# Patient Record
Sex: FEMALE | Race: BLACK OR AFRICAN AMERICAN | NOT HISPANIC OR LATINO | URBAN - METROPOLITAN AREA
[De-identification: names, ages, dates, MRNs, and addresses within clinical notes are randomized per-mention and may not be internally consistent; named-entity substitution may affect disease eponyms.]

---

## 2017-05-21 ENCOUNTER — EMERGENCY (EMERGENCY)
Facility: HOSPITAL | Age: 52
LOS: 1 days | Discharge: HOME | End: 2017-05-21
Attending: EMERGENCY MEDICINE | Admitting: EMERGENCY MEDICINE
Payer: COMMERCIAL

## 2017-05-21 VITALS
DIASTOLIC BLOOD PRESSURE: 100 MMHG | SYSTOLIC BLOOD PRESSURE: 150 MMHG | OXYGEN SATURATION: 100 % | RESPIRATION RATE: 16 BRPM | TEMPERATURE: 98 F | HEART RATE: 74 BPM

## 2017-05-21 LAB
ALBUMIN SERPL ELPH-MCNC: 3.5 G/DL — SIGNIFICANT CHANGE UP (ref 3.3–5)
ALP SERPL-CCNC: 60 U/L — SIGNIFICANT CHANGE UP (ref 40–120)
ALT FLD-CCNC: 10 U/L — SIGNIFICANT CHANGE UP (ref 4–33)
AST SERPL-CCNC: 15 U/L — SIGNIFICANT CHANGE UP (ref 4–32)
BASOPHILS # BLD AUTO: 0.01 K/UL — SIGNIFICANT CHANGE UP (ref 0–0.2)
BASOPHILS NFR BLD AUTO: 0.1 % — SIGNIFICANT CHANGE UP (ref 0–2)
BILIRUB SERPL-MCNC: 0.5 MG/DL — SIGNIFICANT CHANGE UP (ref 0.2–1.2)
BUN SERPL-MCNC: 14 MG/DL — SIGNIFICANT CHANGE UP (ref 7–23)
CALCIUM SERPL-MCNC: 8.8 MG/DL — SIGNIFICANT CHANGE UP (ref 8.4–10.5)
CHLORIDE SERPL-SCNC: 107 MMOL/L — SIGNIFICANT CHANGE UP (ref 98–107)
CK MB BLD-MCNC: 1 NG/ML — SIGNIFICANT CHANGE UP (ref 1–4.7)
CK MB BLD-MCNC: SIGNIFICANT CHANGE UP (ref 0–2.5)
CK SERPL-CCNC: 31 U/L — SIGNIFICANT CHANGE UP (ref 25–170)
CO2 SERPL-SCNC: 24 MMOL/L — SIGNIFICANT CHANGE UP (ref 22–31)
CREAT SERPL-MCNC: 0.62 MG/DL — SIGNIFICANT CHANGE UP (ref 0.5–1.3)
EOSINOPHIL # BLD AUTO: 0.13 K/UL — SIGNIFICANT CHANGE UP (ref 0–0.5)
EOSINOPHIL NFR BLD AUTO: 1.6 % — SIGNIFICANT CHANGE UP (ref 0–6)
GLUCOSE SERPL-MCNC: 94 MG/DL — SIGNIFICANT CHANGE UP (ref 70–99)
HCT VFR BLD CALC: 37.3 % — SIGNIFICANT CHANGE UP (ref 34.5–45)
HGB BLD-MCNC: 12 G/DL — SIGNIFICANT CHANGE UP (ref 11.5–15.5)
IMM GRANULOCYTES NFR BLD AUTO: 0.1 % — SIGNIFICANT CHANGE UP (ref 0–1.5)
LYMPHOCYTES # BLD AUTO: 2.5 K/UL — SIGNIFICANT CHANGE UP (ref 1–3.3)
LYMPHOCYTES # BLD AUTO: 29.9 % — SIGNIFICANT CHANGE UP (ref 13–44)
MCHC RBC-ENTMCNC: 28.8 PG — SIGNIFICANT CHANGE UP (ref 27–34)
MCHC RBC-ENTMCNC: 32.2 % — SIGNIFICANT CHANGE UP (ref 32–36)
MCV RBC AUTO: 89.4 FL — SIGNIFICANT CHANGE UP (ref 80–100)
MONOCYTES # BLD AUTO: 0.73 K/UL — SIGNIFICANT CHANGE UP (ref 0–0.9)
MONOCYTES NFR BLD AUTO: 8.7 % — SIGNIFICANT CHANGE UP (ref 2–14)
NEUTROPHILS # BLD AUTO: 4.98 K/UL — SIGNIFICANT CHANGE UP (ref 1.8–7.4)
NEUTROPHILS NFR BLD AUTO: 59.6 % — SIGNIFICANT CHANGE UP (ref 43–77)
PLATELET # BLD AUTO: 257 K/UL — SIGNIFICANT CHANGE UP (ref 150–400)
PMV BLD: 10.2 FL — SIGNIFICANT CHANGE UP (ref 7–13)
POTASSIUM SERPL-MCNC: 4.8 MMOL/L — SIGNIFICANT CHANGE UP (ref 3.5–5.3)
POTASSIUM SERPL-SCNC: 4.8 MMOL/L — SIGNIFICANT CHANGE UP (ref 3.5–5.3)
PROT SERPL-MCNC: 7 G/DL — SIGNIFICANT CHANGE UP (ref 6–8.3)
RBC # BLD: 4.17 M/UL — SIGNIFICANT CHANGE UP (ref 3.8–5.2)
RBC # FLD: 15.5 % — HIGH (ref 10.3–14.5)
SODIUM SERPL-SCNC: 144 MMOL/L — SIGNIFICANT CHANGE UP (ref 135–145)
WBC # BLD: 8.36 K/UL — SIGNIFICANT CHANGE UP (ref 3.8–10.5)
WBC # FLD AUTO: 8.36 K/UL — SIGNIFICANT CHANGE UP (ref 3.8–10.5)

## 2017-05-21 PROCEDURE — 99285 EMERGENCY DEPT VISIT HI MDM: CPT

## 2017-05-21 RX ORDER — KETOROLAC TROMETHAMINE 30 MG/ML
15 SYRINGE (ML) INJECTION ONCE
Qty: 0 | Refills: 0 | Status: DISCONTINUED | OUTPATIENT
Start: 2017-05-21 | End: 2017-05-21

## 2017-05-21 RX ORDER — ACETAMINOPHEN 500 MG
975 TABLET ORAL ONCE
Qty: 0 | Refills: 0 | Status: COMPLETED | OUTPATIENT
Start: 2017-05-21 | End: 2017-05-21

## 2017-05-21 RX ORDER — SODIUM CHLORIDE 9 MG/ML
1000 INJECTION INTRAMUSCULAR; INTRAVENOUS; SUBCUTANEOUS ONCE
Qty: 0 | Refills: 0 | Status: COMPLETED | OUTPATIENT
Start: 2017-05-21 | End: 2017-05-21

## 2017-05-21 RX ADMIN — Medication 15 MILLIGRAM(S): at 11:56

## 2017-05-21 RX ADMIN — Medication 975 MILLIGRAM(S): at 11:56

## 2017-05-21 RX ADMIN — SODIUM CHLORIDE 2000 MILLILITER(S): 9 INJECTION INTRAMUSCULAR; INTRAVENOUS; SUBCUTANEOUS at 11:57

## 2017-05-21 RX ADMIN — Medication 0.5 MILLIGRAM(S): at 11:57

## 2017-05-21 NOTE — ED PROVIDER NOTE - OBJECTIVE STATEMENT
52 yo F presenting with bilateral thigh pain for 3-4 days.  Progressive, waxing and waning, worse with movement, not better with anything, moderate in severity.  Reports cramping feelinng, sometimes involving her hands.  No trauma.  Reports chronic back pain, states this is unchanged and has been present for a year or more.  No urine retention, no urine or fecal incont, no numbness, no groin anesthesia, no ataxia or difficulty ambulating.

## 2017-05-21 NOTE — ED PROVIDER NOTE - PHYSICAL EXAMINATION
Chandrika att: General: Well appearing, nontoxic, no acute distress; Head: Normocephalic Atraumatic; Eyes: PERRL, EOMI; ENT: Airway patent; Neck: supple; Chest: Lungs clear to auscultation bilateral; Cardiac: Regular rate and rhythm, no murmurs, rubs or gallops; Abdomen: soft, nontender, nondistended; no guarding or rebound; Musculoskeletal: Bilateral thigh tenderness, no pain with axial loading, left knee mild tenderness, no effusion or warmth, calves symmetric, nontender, no palpable cord; Skin: No rash, normal skin tone; Neuro: Alert and Oriented to person, place, and time; No focal deficit, CN 2-12 symmetric and intact

## 2018-06-24 NOTE — ED PROVIDER NOTE - NS ED NOTE AC HIGH RISK COUNTRIES
No · Likely more of toxic metabolic encephalopathy secondary to patient's infection/pneumonia  · Has history of multiple strokes in the past  · CT scan of the head done today did not reveal any acute findings  · Neuro checks  · Consider Neurology consultation, if no improvement  · Manage patient's infection/pneumonia

## 2020-06-25 ENCOUNTER — TRANSCRIPTION ENCOUNTER (OUTPATIENT)
Age: 55
End: 2020-06-25

## 2020-06-26 ENCOUNTER — INPATIENT (INPATIENT)
Facility: HOSPITAL | Age: 55
LOS: 3 days | Discharge: HOME HEALTH | End: 2020-06-30
Attending: HOSPITALIST | Admitting: HOSPITALIST
Payer: COMMERCIAL

## 2020-06-26 VITALS
SYSTOLIC BLOOD PRESSURE: 113 MMHG | OXYGEN SATURATION: 100 % | HEART RATE: 91 BPM | RESPIRATION RATE: 16 BRPM | TEMPERATURE: 100 F | DIASTOLIC BLOOD PRESSURE: 73 MMHG

## 2020-06-26 DIAGNOSIS — Z02.9 ENCOUNTER FOR ADMINISTRATIVE EXAMINATIONS, UNSPECIFIED: ICD-10-CM

## 2020-06-26 DIAGNOSIS — M19.90 UNSPECIFIED OSTEOARTHRITIS, UNSPECIFIED SITE: ICD-10-CM

## 2020-06-26 DIAGNOSIS — I10 ESSENTIAL (PRIMARY) HYPERTENSION: ICD-10-CM

## 2020-06-26 DIAGNOSIS — L02.511 CUTANEOUS ABSCESS OF RIGHT HAND: ICD-10-CM

## 2020-06-26 DIAGNOSIS — L03.011 CELLULITIS OF RIGHT FINGER: ICD-10-CM

## 2020-06-26 DIAGNOSIS — Z00.00 ENCOUNTER FOR GENERAL ADULT MEDICAL EXAMINATION WITHOUT ABNORMAL FINDINGS: ICD-10-CM

## 2020-06-26 DIAGNOSIS — A41.9 SEPSIS, UNSPECIFIED ORGANISM: ICD-10-CM

## 2020-06-26 LAB
ALBUMIN SERPL ELPH-MCNC: 3.3 G/DL — SIGNIFICANT CHANGE UP (ref 3.3–5)
ALP SERPL-CCNC: 70 U/L — SIGNIFICANT CHANGE UP (ref 40–120)
ALT FLD-CCNC: 13 U/L — SIGNIFICANT CHANGE UP (ref 4–33)
ANION GAP SERPL CALC-SCNC: 13 MMO/L — SIGNIFICANT CHANGE UP (ref 7–14)
AST SERPL-CCNC: 11 U/L — SIGNIFICANT CHANGE UP (ref 4–32)
BASOPHILS # BLD AUTO: 0.03 K/UL — SIGNIFICANT CHANGE UP (ref 0–0.2)
BASOPHILS NFR BLD AUTO: 0.2 % — SIGNIFICANT CHANGE UP (ref 0–2)
BILIRUB SERPL-MCNC: 0.2 MG/DL — SIGNIFICANT CHANGE UP (ref 0.2–1.2)
BUN SERPL-MCNC: 3 MG/DL — LOW (ref 7–23)
CALCIUM SERPL-MCNC: 9.3 MG/DL — SIGNIFICANT CHANGE UP (ref 8.4–10.5)
CHLORIDE SERPL-SCNC: 101 MMOL/L — SIGNIFICANT CHANGE UP (ref 98–107)
CO2 SERPL-SCNC: 22 MMOL/L — SIGNIFICANT CHANGE UP (ref 22–31)
CREAT SERPL-MCNC: 0.67 MG/DL — SIGNIFICANT CHANGE UP (ref 0.5–1.3)
CRP SERPL-MCNC: 258.8 MG/L — HIGH
EOSINOPHIL # BLD AUTO: 0.04 K/UL — SIGNIFICANT CHANGE UP (ref 0–0.5)
EOSINOPHIL NFR BLD AUTO: 0.2 % — SIGNIFICANT CHANGE UP (ref 0–6)
ERYTHROCYTE [SEDIMENTATION RATE] IN BLOOD: 69 MM/HR — HIGH (ref 4–25)
GLUCOSE SERPL-MCNC: 133 MG/DL — HIGH (ref 70–99)
HCT VFR BLD CALC: 34.4 % — LOW (ref 34.5–45)
HGB BLD-MCNC: 11.3 G/DL — LOW (ref 11.5–15.5)
IMM GRANULOCYTES NFR BLD AUTO: 0.7 % — SIGNIFICANT CHANGE UP (ref 0–1.5)
LYMPHOCYTES # BLD AUTO: 1.74 K/UL — SIGNIFICANT CHANGE UP (ref 1–3.3)
LYMPHOCYTES # BLD AUTO: 10.6 % — LOW (ref 13–44)
MCHC RBC-ENTMCNC: 30.7 PG — SIGNIFICANT CHANGE UP (ref 27–34)
MCHC RBC-ENTMCNC: 32.8 % — SIGNIFICANT CHANGE UP (ref 32–36)
MCV RBC AUTO: 93.5 FL — SIGNIFICANT CHANGE UP (ref 80–100)
MONOCYTES # BLD AUTO: 1 K/UL — HIGH (ref 0–0.9)
MONOCYTES NFR BLD AUTO: 6.1 % — SIGNIFICANT CHANGE UP (ref 2–14)
NEUTROPHILS # BLD AUTO: 13.47 K/UL — HIGH (ref 1.8–7.4)
NEUTROPHILS NFR BLD AUTO: 82.2 % — HIGH (ref 43–77)
NRBC # FLD: 0 K/UL — SIGNIFICANT CHANGE UP (ref 0–0)
PLATELET # BLD AUTO: 262 K/UL — SIGNIFICANT CHANGE UP (ref 150–400)
PMV BLD: 10 FL — SIGNIFICANT CHANGE UP (ref 7–13)
POTASSIUM SERPL-MCNC: 4 MMOL/L — SIGNIFICANT CHANGE UP (ref 3.5–5.3)
POTASSIUM SERPL-SCNC: 4 MMOL/L — SIGNIFICANT CHANGE UP (ref 3.5–5.3)
PROT SERPL-MCNC: 6.9 G/DL — SIGNIFICANT CHANGE UP (ref 6–8.3)
RBC # BLD: 3.68 M/UL — LOW (ref 3.8–5.2)
RBC # FLD: 15.5 % — HIGH (ref 10.3–14.5)
SARS-COV-2 RNA SPEC QL NAA+PROBE: SIGNIFICANT CHANGE UP
SODIUM SERPL-SCNC: 136 MMOL/L — SIGNIFICANT CHANGE UP (ref 135–145)
WBC # BLD: 16.4 K/UL — HIGH (ref 3.8–10.5)
WBC # FLD AUTO: 16.4 K/UL — HIGH (ref 3.8–10.5)

## 2020-06-26 PROCEDURE — 73130 X-RAY EXAM OF HAND: CPT | Mod: 26,RT

## 2020-06-26 PROCEDURE — 99223 1ST HOSP IP/OBS HIGH 75: CPT | Mod: GC

## 2020-06-26 RX ORDER — IBUPROFEN 200 MG
600 TABLET ORAL EVERY 6 HOURS
Refills: 0 | Status: DISCONTINUED | OUTPATIENT
Start: 2020-06-26 | End: 2020-06-30

## 2020-06-26 RX ORDER — PIPERACILLIN AND TAZOBACTAM 4; .5 G/20ML; G/20ML
3.38 INJECTION, POWDER, LYOPHILIZED, FOR SOLUTION INTRAVENOUS ONCE
Refills: 0 | Status: COMPLETED | OUTPATIENT
Start: 2020-06-26 | End: 2020-06-26

## 2020-06-26 RX ORDER — PIPERACILLIN AND TAZOBACTAM 4; .5 G/20ML; G/20ML
3.38 INJECTION, POWDER, LYOPHILIZED, FOR SOLUTION INTRAVENOUS EVERY 8 HOURS
Refills: 0 | Status: DISCONTINUED | OUTPATIENT
Start: 2020-06-27 | End: 2020-06-28

## 2020-06-26 RX ORDER — SODIUM CHLORIDE 9 MG/ML
1000 INJECTION INTRAMUSCULAR; INTRAVENOUS; SUBCUTANEOUS ONCE
Refills: 0 | Status: COMPLETED | OUTPATIENT
Start: 2020-06-26 | End: 2020-06-26

## 2020-06-26 RX ORDER — VANCOMYCIN HCL 1 G
1000 VIAL (EA) INTRAVENOUS ONCE
Refills: 0 | Status: COMPLETED | OUTPATIENT
Start: 2020-06-26 | End: 2020-06-26

## 2020-06-26 RX ORDER — ACETAMINOPHEN 500 MG
1000 TABLET ORAL EVERY 6 HOURS
Refills: 0 | Status: DISCONTINUED | OUTPATIENT
Start: 2020-06-26 | End: 2020-06-30

## 2020-06-26 RX ORDER — GABAPENTIN 400 MG/1
300 CAPSULE ORAL THREE TIMES A DAY
Refills: 0 | Status: DISCONTINUED | OUTPATIENT
Start: 2020-06-26 | End: 2020-06-30

## 2020-06-26 RX ORDER — LISINOPRIL 2.5 MG/1
5 TABLET ORAL DAILY
Refills: 0 | Status: DISCONTINUED | OUTPATIENT
Start: 2020-06-26 | End: 2020-06-30

## 2020-06-26 RX ORDER — VANCOMYCIN HCL 1 G
1250 VIAL (EA) INTRAVENOUS EVERY 12 HOURS
Refills: 0 | Status: DISCONTINUED | OUTPATIENT
Start: 2020-06-27 | End: 2020-06-30

## 2020-06-26 RX ORDER — KETOROLAC TROMETHAMINE 30 MG/ML
15 SYRINGE (ML) INJECTION ONCE
Refills: 0 | Status: DISCONTINUED | OUTPATIENT
Start: 2020-06-26 | End: 2020-06-26

## 2020-06-26 RX ADMIN — SODIUM CHLORIDE 1000 MILLILITER(S): 9 INJECTION INTRAMUSCULAR; INTRAVENOUS; SUBCUTANEOUS at 17:00

## 2020-06-26 RX ADMIN — PIPERACILLIN AND TAZOBACTAM 200 GRAM(S): 4; .5 INJECTION, POWDER, LYOPHILIZED, FOR SOLUTION INTRAVENOUS at 16:29

## 2020-06-26 RX ADMIN — Medication 15 MILLIGRAM(S): at 20:00

## 2020-06-26 RX ADMIN — SODIUM CHLORIDE 1000 MILLILITER(S): 9 INJECTION INTRAMUSCULAR; INTRAVENOUS; SUBCUTANEOUS at 16:29

## 2020-06-26 RX ADMIN — Medication 250 MILLIGRAM(S): at 17:39

## 2020-06-26 RX ADMIN — Medication 1000 MILLIGRAM(S): at 18:30

## 2020-06-26 RX ADMIN — GABAPENTIN 300 MILLIGRAM(S): 400 CAPSULE ORAL at 21:32

## 2020-06-26 RX ADMIN — PIPERACILLIN AND TAZOBACTAM 3.38 GRAM(S): 4; .5 INJECTION, POWDER, LYOPHILIZED, FOR SOLUTION INTRAVENOUS at 17:00

## 2020-06-26 NOTE — ED PROVIDER NOTE - CLINICAL SUMMARY MEDICAL DECISION MAKING FREE TEXT BOX
Tish: R thumb palmar-aspect red/swelling/pain c/w infection x 5 days. No trauma. Will give IV Abx. Call Hand Surg. Failing Keflex. Admission for abscess/cellulitis, possibly flexor tenosynovitis.

## 2020-06-26 NOTE — ED PROVIDER NOTE - OBJECTIVE STATEMENT
54y f no sig PMhx p/w R thumb swelling, pain, and drainage. 5 days ago the pt developed slight pain and swelling along the plantar aspect of her thumb, which has since progressed to include the entire length of the thumb, and has become so swollen and painful she is unable to range the digit at all. She also noted pustular drainage from the base of the thumb where her PMD intended to cipriano the area of swelling 3 days ago and again today. For 3 days she has been taking Keflex without improvement of her symptoms; she also noted subjective fevers/chills at home during this time. Pt denies any known trauma to the thumb, denies cuts/bites/scrapes.

## 2020-06-26 NOTE — H&P ADULT - NSHPREVIEWOFSYSTEMS_GEN_ALL_CORE
REVIEW OF SYSTEMS:    CONSTITUTIONAL: Appears in mild pain   EYES/ENT: No visual changes;  No vertigo or throat pain   NECK: No pain or stiffness  RESPIRATORY: No cough, wheezing, hemoptysis; No shortness of breath  CARDIOVASCULAR: No chest pain or palpitations  GASTROINTESTINAL: No abdominal or epigastric pain. No nausea, vomiting, or hematemesis; No diarrhea or constipation. No melena or hematochezia.  GENITOURINARY: No dysuria, frequency or hematuria  NEUROLOGICAL: No numbness or weakness  SKIN: swollen and inflamed right thumb and index finger

## 2020-06-26 NOTE — H&P ADULT - PROBLEM SELECTOR PLAN 1
sepsis 2/2 hand cellulitis, elevated WBC of 16.4, ESR and CRP, febrile at home, tachycardia   - vanc/zosyn   - MRI hand r/o osteomyelitis sepsis 2/2 hand cellulitis, elevated WBC of 16.4, ESR and CRP, febrile at home, tachycardia   - vanc/zosyn   - MRI hand r/o osteomyelitis  - f/u wound cultures

## 2020-06-26 NOTE — H&P ADULT - NSHPLABSRESULTS_GEN_ALL_CORE
11.3   16.40 )-----------( 262      ( 26 Jun 2020 16:06 )             34.4     06-26    136  |  101  |  3<L>  ----------------------------<  133<H>  4.0   |  22  |  0.67    Ca    9.3      26 Jun 2020 16:06    TPro  6.9  /  Alb  3.3  /  TBili  0.2  /  DBili  x   /  AST  11  /  ALT  13  /  AlkPhos  70  06-26            Lactate Trend      CAPILLARY BLOOD GLUCOSE

## 2020-06-26 NOTE — ED PROVIDER NOTE - PROGRESS NOTE DETAILS
Zoraida, pgy3: hand surgery to drain abscess at bedside, do not suspect flexor tenosynovitis, will likely placed small drain, and recommending admission for IV antibx

## 2020-06-26 NOTE — ED ADULT TRIAGE NOTE - CHIEF COMPLAINT QUOTE
c/o right hand swelling/ pain x 1 week, fever/ chills x 3 days. pt dx by pcp with right hand  infection on Wednesday sent to ed for eval by hand specialist. PMH: HTN, arthritis

## 2020-06-26 NOTE — PROGRESS NOTE ADULT - SUBJECTIVE AND OBJECTIVE BOX
Soft tissue infection right thumb  Drained and drains placed  Culture collected      Admit for IV Abx  Elevation and soaks TID  Will follow

## 2020-06-26 NOTE — H&P ADULT - ASSESSMENT
55 y/o F w/ PMH HTN and arthritis p/w right thumb swelling in the setting of tachycardia, elevated WBC and fever at home most likely sepsis 2/2 cellulitis. Pt currently afebrile with stable vitals wnl. Labs show leukocytosis treated with vanc/zocyn, first dose given in ED. Pain control with Tylenol and Toradol. Pt currently stable. Consult with plastics, will follow.

## 2020-06-26 NOTE — ED PROVIDER NOTE - ATTENDING CONTRIBUTION TO CARE
I performed a face-to-face evaluation of the patient and performed a history and physical examination. I agree with the history and physical examination.    R thumb palmar-aspect red/swelling/pain c/w infection x 5 days. No trauma. Will give IV Abx. Call Hand Surg. Failing Keflex. Admission for abscess/cellulitis, possibly flexor tenosynovitis.

## 2020-06-26 NOTE — H&P ADULT - HISTORY OF PRESENT ILLNESS
53 y/o F w/ PMH HTN and arthritis p/w right thumb swelling. Pt states that Sunday 53 y/o F w/ PMH HTN and arthritis p/w right thumb swelling. Pt states that she woke up on Monday morning with swollen painful thumb. She reports stinging and swelling that spread to her index finger. Swelling and pain progressed over the next two days and pt went to PCP for drainage and start of cephalexin 500mg tid. She was given tylenol and codeine for the pain. Pt does not report any trauma to the hand but states that she was planning a graduation party for Sunday night and consumed about 5-6 drinks. She also gardened last week but wore gloves and does not report any insect bite, bee sting or puncture. She reports a fever last night but denies chills, cough, SOB, chest pain, n/v, constipation and diarrhea.

## 2020-06-26 NOTE — ED ADULT NURSE NOTE - OBJECTIVE STATEMENT
Received pt in intake 10B, ambulatory, pt A&Ox4, respirations even and unlabored b/l. Pt c/o R. hand thumb redness, swelling, pain started 5 days ago after possibly from gardening. Also endorsing fever, chills. Went to PMD and had thumb I&D, dx with infection and sent to ED to c/s hand. Swelling noted on R. thumb radiating to hand. MD Aceves at bedside for eval. IVL 20g Angiocath placed on left hand. Labs sent. PMHx htn, arthritis. Will continue to monitor.

## 2020-06-26 NOTE — H&P ADULT - NSHPPHYSICALEXAM_GEN_ALL_CORE
PHYSICAL EXAM:  T(C): 37.1 (06-26-20 @ 18:14), Max: 37.5 (06-26-20 @ 14:49)  HR: 70 (06-26-20 @ 18:14) (70 - 91)  BP: 125/80 (06-26-20 @ 18:14) (113/73 - 125/80)  RR: 16 (06-26-20 @ 18:14) (16 - 16)  SpO2: 100% (06-26-20 @ 18:14) (100% - 100%)  GENERAL: NAD, well-developed  HEAD:  Atraumatic, Normocephalic  EYES: EOMI, PERRLA, conjunctiva and sclera clear  NECK: Supple, No JVD  CHEST/LUNG: Clear to auscultation bilaterally; No wheeze  HEART: Regular rate and rhythm; No murmurs, rubs, or gallops  ABDOMEN: Soft, Nontender, Nondistended; Bowel sounds present  EXTREMITIES:  2+ Peripheral Pulses, No clubbing, cyanosis, or edema  PSYCH: AAOx3  NEUROLOGY: non-focal  SKIN: No rashes or lesions PHYSICAL EXAM:  T(C): 37.1 (06-26-20 @ 18:14), Max: 37.5 (06-26-20 @ 14:49)  HR: 70 (06-26-20 @ 18:14) (70 - 91)  BP: 125/80 (06-26-20 @ 18:14) (113/73 - 125/80)  RR: 16 (06-26-20 @ 18:14) (16 - 16)  SpO2: 100% (06-26-20 @ 18:14) (100% - 100%)  GENERAL: NAD, well-developed  HEAD:  Atraumatic, Normocephalic  EYES: EOMI, PERRLA, conjunctiva and sclera clear  NECK: Supple, No JVD  CHEST/LUNG: Clear to auscultation bilaterally; No wheeze  HEART: Regular rate and rhythm; No murmurs, rubs, or gallops  ABDOMEN: Soft, Nontender, Nondistended; Bowel sounds present  EXTREMITIES:  2+ Peripheral Pulses, No clubbing, cyanosis  PSYCH: AAOx3  NEUROLOGY: non-focal  SKIN: right hand dressing in place with localized erythema and edema involving the right thumb and hand

## 2020-06-26 NOTE — H&P ADULT - ATTENDING COMMENTS
Patient seen and examined on 6/26/20.    54F with PMH HTN presented with progressive right hand/thumb pain and swelling that started 5 days PTA. Developed right thumb abscess, darined at PCP office and was given PO abx. Patient presented to ED with progressive symptoms. Sepsis due to right thumb abscess and cellulitis.  agree with abx regimen. MR to rule-out OM given close proximity to bone.  f/u hand surgery.

## 2020-06-26 NOTE — ED PROVIDER NOTE - PHYSICAL EXAMINATION
MSK: R thumb diffusely swollen, with worsening swelling at proximal end/MTP joint, significantly decreased ROM, with severe pain to palpation and w/ ROM   SKIN: R thumb area of swelling appears pale/yellow w/ active drainage from two apparent needle pricks; also obvious abrasion/skin tear near base of thumb w/out overlying skin flap, area has pus overlying and appears non-healing

## 2020-06-27 LAB
BASOPHILS # BLD AUTO: 0.02 K/UL — SIGNIFICANT CHANGE UP (ref 0–0.2)
BASOPHILS NFR BLD AUTO: 0.2 % — SIGNIFICANT CHANGE UP (ref 0–2)
EOSINOPHIL # BLD AUTO: 0.08 K/UL — SIGNIFICANT CHANGE UP (ref 0–0.5)
EOSINOPHIL NFR BLD AUTO: 0.8 % — SIGNIFICANT CHANGE UP (ref 0–6)
HCT VFR BLD CALC: 33.7 % — LOW (ref 34.5–45)
HCV AB S/CO SERPL IA: 0.29 S/CO — SIGNIFICANT CHANGE UP (ref 0–0.99)
HCV AB SERPL-IMP: SIGNIFICANT CHANGE UP
HGB BLD-MCNC: 10.6 G/DL — LOW (ref 11.5–15.5)
IMM GRANULOCYTES NFR BLD AUTO: 0.4 % — SIGNIFICANT CHANGE UP (ref 0–1.5)
LYMPHOCYTES # BLD AUTO: 1.81 K/UL — SIGNIFICANT CHANGE UP (ref 1–3.3)
LYMPHOCYTES # BLD AUTO: 18.4 % — SIGNIFICANT CHANGE UP (ref 13–44)
MCHC RBC-ENTMCNC: 29.2 PG — SIGNIFICANT CHANGE UP (ref 27–34)
MCHC RBC-ENTMCNC: 31.5 % — LOW (ref 32–36)
MCV RBC AUTO: 92.8 FL — SIGNIFICANT CHANGE UP (ref 80–100)
MONOCYTES # BLD AUTO: 0.65 K/UL — SIGNIFICANT CHANGE UP (ref 0–0.9)
MONOCYTES NFR BLD AUTO: 6.6 % — SIGNIFICANT CHANGE UP (ref 2–14)
NEUTROPHILS # BLD AUTO: 7.23 K/UL — SIGNIFICANT CHANGE UP (ref 1.8–7.4)
NEUTROPHILS NFR BLD AUTO: 73.6 % — SIGNIFICANT CHANGE UP (ref 43–77)
NRBC # FLD: 0 K/UL — SIGNIFICANT CHANGE UP (ref 0–0)
PLATELET # BLD AUTO: 248 K/UL — SIGNIFICANT CHANGE UP (ref 150–400)
PMV BLD: 9.9 FL — SIGNIFICANT CHANGE UP (ref 7–13)
RBC # BLD: 3.63 M/UL — LOW (ref 3.8–5.2)
RBC # FLD: 15.4 % — HIGH (ref 10.3–14.5)
WBC # BLD: 9.83 K/UL — SIGNIFICANT CHANGE UP (ref 3.8–10.5)
WBC # FLD AUTO: 9.83 K/UL — SIGNIFICANT CHANGE UP (ref 3.8–10.5)

## 2020-06-27 PROCEDURE — 99233 SBSQ HOSP IP/OBS HIGH 50: CPT | Mod: GC

## 2020-06-27 RX ORDER — TRAMADOL HYDROCHLORIDE 50 MG/1
25 TABLET ORAL ONCE
Refills: 0 | Status: DISCONTINUED | OUTPATIENT
Start: 2020-06-27 | End: 2020-06-27

## 2020-06-27 RX ORDER — TRAMADOL HYDROCHLORIDE 50 MG/1
50 TABLET ORAL ONCE
Refills: 0 | Status: DISCONTINUED | OUTPATIENT
Start: 2020-06-27 | End: 2020-06-27

## 2020-06-27 RX ADMIN — GABAPENTIN 300 MILLIGRAM(S): 400 CAPSULE ORAL at 05:20

## 2020-06-27 RX ADMIN — GABAPENTIN 300 MILLIGRAM(S): 400 CAPSULE ORAL at 22:30

## 2020-06-27 RX ADMIN — LISINOPRIL 5 MILLIGRAM(S): 2.5 TABLET ORAL at 05:20

## 2020-06-27 RX ADMIN — Medication 600 MILLIGRAM(S): at 11:19

## 2020-06-27 RX ADMIN — PIPERACILLIN AND TAZOBACTAM 25 GRAM(S): 4; .5 INJECTION, POWDER, LYOPHILIZED, FOR SOLUTION INTRAVENOUS at 09:00

## 2020-06-27 RX ADMIN — TRAMADOL HYDROCHLORIDE 25 MILLIGRAM(S): 50 TABLET ORAL at 14:35

## 2020-06-27 RX ADMIN — PIPERACILLIN AND TAZOBACTAM 25 GRAM(S): 4; .5 INJECTION, POWDER, LYOPHILIZED, FOR SOLUTION INTRAVENOUS at 18:28

## 2020-06-27 RX ADMIN — GABAPENTIN 300 MILLIGRAM(S): 400 CAPSULE ORAL at 12:07

## 2020-06-27 RX ADMIN — Medication 166.67 MILLIGRAM(S): at 16:06

## 2020-06-27 RX ADMIN — Medication 166.67 MILLIGRAM(S): at 05:20

## 2020-06-27 RX ADMIN — PIPERACILLIN AND TAZOBACTAM 25 GRAM(S): 4; .5 INJECTION, POWDER, LYOPHILIZED, FOR SOLUTION INTRAVENOUS at 00:00

## 2020-06-27 RX ADMIN — TRAMADOL HYDROCHLORIDE 50 MILLIGRAM(S): 50 TABLET ORAL at 22:40

## 2020-06-27 NOTE — PROGRESS NOTE ADULT - ASSESSMENT
Assessment/Plan:  Patient is a 54y old  Female who presents with a chief complaint of right swollen hand (27 Jun 2020 09:13). WBC coming down and exam improving.   - Continue soaks to hand TID  - Continue abx  - follow up cultures

## 2020-06-27 NOTE — PROGRESS NOTE ADULT - SUBJECTIVE AND OBJECTIVE BOX
PROGRESS NOTE:   Authored by Dr. Yamel Childress MD  Pager [phone] [facility], [id number] [facility]     Patient is a 54y old  Female who presents with a chief complaint of right swollen hand (26 Jun 2020 19:10)      SUBJECTIVE / OVERNIGHT EVENTS:  Pt doing well. Plastics performed incision and drainage last night and changed dressing. Hand is less swollen today and she is feeling less pain. Pt denies fever, chills, SOB, n/v/d/c     REVIEW OF SYSTEMS:    CONSTITUTIONAL: No weakness, fevers or chills  EYES/ENT: No visual changes;  No vertigo or throat pain   NECK: No pain or stiffness  RESPIRATORY: No cough, wheezing, hemoptysis; No shortness of breath  CARDIOVASCULAR: No chest pain or palpitations  GASTROINTESTINAL: No abdominal or epigastric pain. No nausea, vomiting, or hematemesis; No diarrhea or constipation. No melena or hematochezia.  GENITOURINARY: No dysuria, frequency or hematuria  NEUROLOGICAL: No numbness or weakness  SKIN: right hand swelling and pain      MEDICATIONS  (STANDING):  gabapentin 300 milliGRAM(s) Oral three times a day  lisinopril 5 milliGRAM(s) Oral daily  piperacillin/tazobactam IVPB.. 3.375 Gram(s) IV Intermittent every 8 hours  vancomycin  IVPB 1250 milliGRAM(s) IV Intermittent every 12 hours    MEDICATIONS  (PRN):  acetaminophen   Tablet .. 1000 milliGRAM(s) Oral every 6 hours PRN Moderate Pain (4 - 6)  ibuprofen  Tablet. 600 milliGRAM(s) Oral every 6 hours PRN Severe Pain (7 - 10)      CAPILLARY BLOOD GLUCOSE        I&O's Summary      PHYSICAL EXAM:  Vital Signs Last 24 Hrs  T(C): 36.7 (27 Jun 2020 05:19), Max: 37.5 (26 Jun 2020 14:49)  T(F): 98 (27 Jun 2020 05:19), Max: 99.5 (26 Jun 2020 14:49)  HR: 64 (27 Jun 2020 05:19) (58 - 91)  BP: 113/75 (27 Jun 2020 05:19) (113/73 - 139/84)  BP(mean): --  RR: 18 (27 Jun 2020 05:19) (16 - 18)  SpO2: 100% (27 Jun 2020 05:19) (100% - 100%)    GENERAL: No acute distress, well-developed  HEAD:  Atraumatic, Normocephalic  EYES: EOMI, PERRLA, conjunctiva and sclera clear  NECK: Supple, no lymphadenopathy, no JVD  CHEST/LUNG: CTAB; No wheezes, rales, or rhonchi  HEART: Regular rate and rhythm; No murmurs, rubs, or gallops  ABDOMEN: Soft, non-tender, non-distended; normal bowel sounds, no organomegaly  EXTREMITIES:  2+ peripheral pulses b/l, No clubbing, cyanosis, or edema, right hand is less swollen than yesterday. The indeax finger is almost back to normal size and less pain when palpated.   NEUROLOGY: A&O x 3, no focal deficits  SKIN: No rashes or lesions    LABS:                        10.6   9.83  )-----------( 248      ( 27 Jun 2020 05:25 )             33.7     06-26    136  |  101  |  3<L>  ----------------------------<  133<H>  4.0   |  22  |  0.67    Ca    9.3      26 Jun 2020 16:06    TPro  6.9  /  Alb  3.3  /  TBili  0.2  /  DBili  x   /  AST  11  /  ALT  13  /  AlkPhos  70  06-26        RADIOLOGY & ADDITIONAL TESTS:  Results Reviewed:   Imaging Personally Reviewed:  Electrocardiogram Personally Reviewed:    COORDINATION OF CARE:  Care Discussed with Consultants/Other Providers [Y/N]:  Prior or Outpatient Records Reviewed [Y/N]:

## 2020-06-27 NOTE — PROGRESS NOTE ADULT - PROBLEM SELECTOR PLAN 1
sepsis 2/2 hand cellulitis, elevated WBC of 16.4, ESR and CRP, febrile at home, tachycardia   - vanc/zosyn   - f/u MRI hand r/o osteomyelitis

## 2020-06-27 NOTE — PROGRESS NOTE ADULT - ASSESSMENT
53 y/o F w/ PMH HTN and arthritis p/w right thumb swelling in the setting of tachycardia, elevated WBC and fever at home most likely sepsis 2/2 cellulitis. Pt currently afebrile with stable vitals wnl. Labs show leukocytosis treated with vanc/zocyn, first dose given in ED. Pain control with Tylenol and Toradol. Pt currently stable. Consult with plastics, will follow.

## 2020-06-27 NOTE — PROGRESS NOTE ADULT - SUBJECTIVE AND OBJECTIVE BOX
ALEXANDRA OLIVIA  [id number]    Subjective:  Patient is a 54y old  Female who presents with a chief complaint of right swollen hand (27 Jun 2020 09:13). No acute events overnight. Pain controlled, patient states her hand feels better.        Objective:  T(C): 36.7 (06-27-20 @ 05:19), Max: 37.5 (06-26-20 @ 14:49)  HR: 64 (06-27-20 @ 05:19) (58 - 91)  BP: 113/75 (06-27-20 @ 05:19) (113/73 - 139/84)  RR: 18 (06-27-20 @ 05:19) (16 - 18)  SpO2: 100% (06-27-20 @ 05:19) (100% - 100%)  Wt(kg): --   06-26    136  |  101  |  3<L>  ----------------------------<  133<H>  4.0   |  22  |  0.67    Ca    9.3      26 Jun 2020 16:06    TPro  6.9  /  Alb  3.3  /  TBili  0.2  /  DBili  x   /  AST  11  /  ALT  13  /  AlkPhos  70  06-26                        10.6   9.83  )-----------( 248      ( 27 Jun 2020 05:25 )             33.7       PHYSICAL EXAM:    General: NAD, resting comfortably in bed  MSK: Right thumb swelling improving, erythema improving. No expressible fluid. Still minimal active ROM      MEDICATIONS  (STANDING):  gabapentin 300 milliGRAM(s) Oral three times a day  lisinopril 5 milliGRAM(s) Oral daily  piperacillin/tazobactam IVPB.. 3.375 Gram(s) IV Intermittent every 8 hours  vancomycin  IVPB 1250 milliGRAM(s) IV Intermittent every 12 hours    MEDICATIONS  (PRN):  acetaminophen   Tablet .. 1000 milliGRAM(s) Oral every 6 hours PRN Moderate Pain (4 - 6)  ibuprofen  Tablet. 600 milliGRAM(s) Oral every 6 hours PRN Severe Pain (7 - 10)

## 2020-06-28 LAB
-  AMPICILLIN/SULBACTAM: SIGNIFICANT CHANGE UP
-  CEFAZOLIN: SIGNIFICANT CHANGE UP
-  CLINDAMYCIN: SIGNIFICANT CHANGE UP
-  DAPTOMYCIN: SIGNIFICANT CHANGE UP
-  ERYTHROMYCIN: SIGNIFICANT CHANGE UP
-  GENTAMICIN: SIGNIFICANT CHANGE UP
-  LINEZOLID: SIGNIFICANT CHANGE UP
-  OXACILLIN: SIGNIFICANT CHANGE UP
-  PENICILLIN: SIGNIFICANT CHANGE UP
-  RIFAMPIN: SIGNIFICANT CHANGE UP
-  TETRACYCLINE: SIGNIFICANT CHANGE UP
-  TRIMETHOPRIM/SULFAMETHOXAZOLE: SIGNIFICANT CHANGE UP
-  VANCOMYCIN: SIGNIFICANT CHANGE UP
METHOD TYPE: SIGNIFICANT CHANGE UP
VANCOMYCIN TROUGH SERPL-MCNC: 13.2 UG/ML — SIGNIFICANT CHANGE UP (ref 10–20)

## 2020-06-28 PROCEDURE — 99233 SBSQ HOSP IP/OBS HIGH 50: CPT | Mod: GC

## 2020-06-28 RX ORDER — POLYETHYLENE GLYCOL 3350 17 G/17G
17 POWDER, FOR SOLUTION ORAL DAILY
Refills: 0 | Status: DISCONTINUED | OUTPATIENT
Start: 2020-06-28 | End: 2020-06-30

## 2020-06-28 RX ORDER — TRAMADOL HYDROCHLORIDE 50 MG/1
25 TABLET ORAL EVERY 6 HOURS
Refills: 0 | Status: DISCONTINUED | OUTPATIENT
Start: 2020-06-28 | End: 2020-06-30

## 2020-06-28 RX ADMIN — TRAMADOL HYDROCHLORIDE 25 MILLIGRAM(S): 50 TABLET ORAL at 13:18

## 2020-06-28 RX ADMIN — GABAPENTIN 300 MILLIGRAM(S): 400 CAPSULE ORAL at 06:15

## 2020-06-28 RX ADMIN — Medication 166.67 MILLIGRAM(S): at 06:14

## 2020-06-28 RX ADMIN — PIPERACILLIN AND TAZOBACTAM 25 GRAM(S): 4; .5 INJECTION, POWDER, LYOPHILIZED, FOR SOLUTION INTRAVENOUS at 00:49

## 2020-06-28 RX ADMIN — Medication 600 MILLIGRAM(S): at 10:10

## 2020-06-28 RX ADMIN — GABAPENTIN 300 MILLIGRAM(S): 400 CAPSULE ORAL at 22:41

## 2020-06-28 RX ADMIN — GABAPENTIN 300 MILLIGRAM(S): 400 CAPSULE ORAL at 13:19

## 2020-06-28 RX ADMIN — PIPERACILLIN AND TAZOBACTAM 25 GRAM(S): 4; .5 INJECTION, POWDER, LYOPHILIZED, FOR SOLUTION INTRAVENOUS at 08:05

## 2020-06-28 RX ADMIN — TRAMADOL HYDROCHLORIDE 25 MILLIGRAM(S): 50 TABLET ORAL at 20:01

## 2020-06-28 RX ADMIN — LISINOPRIL 5 MILLIGRAM(S): 2.5 TABLET ORAL at 06:15

## 2020-06-28 RX ADMIN — Medication 166.67 MILLIGRAM(S): at 17:22

## 2020-06-28 NOTE — PROGRESS NOTE ADULT - SUBJECTIVE AND OBJECTIVE BOX
Plastic Surgery Progress Note    Subjective  - Pt seen and examined on AM rounds  - Pt reports good pain control with medications  - tolerating soaking well    Objective:    PHYSICAL EXAM:    General: NAD, resting comfortably in bed  MSK: Right thumb swelling improving, erythema improving. No expressible fluid. More active ROM this AM    Vital Signs  T(C): 36.7 (06-28-20 @ 06:12), Max: 36.9 (06-27-20 @ 13:41)  T(F): 98.1 (06-28-20 @ 06:12), Max: 98.4 (06-27-20 @ 13:41)  HR: 62 (06-28-20 @ 06:12) (62 - 74)  BP: 119/76 (06-28-20 @ 06:12) (109/67 - 119/76)  RR: 16 (06-28-20 @ 06:12) (16 - 19)  SpO2: 100% (06-28-20 @ 06:12) (99% - 100%)

## 2020-06-28 NOTE — PROGRESS NOTE ADULT - SUBJECTIVE AND OBJECTIVE BOX
INCOMPLETE NOTE PROGRESS NOTE:     Patient is a 54y old  Female who presents with a chief complaint of right swollen hand (28 Jun 2020 10:08)      SUBJECTIVE / OVERNIGHT EVENTS:  No bowel movement since admission  Complains of numbness at the tip of her R thumb although pain is improving overall. Numbness is improved overall.    ADDITIONAL REVIEW OF SYSTEMS:  No fevers, chest pain, shortness of breath, abdominal pain, lower extremity swelling or pain, dysuria, or constipation.      MEDICATIONS  (STANDING):  gabapentin 300 milliGRAM(s) Oral three times a day  lisinopril 5 milliGRAM(s) Oral daily  vancomycin  IVPB 1250 milliGRAM(s) IV Intermittent every 12 hours    MEDICATIONS  (PRN):  acetaminophen   Tablet .. 1000 milliGRAM(s) Oral every 6 hours PRN Moderate Pain (4 - 6)  ibuprofen  Tablet. 600 milliGRAM(s) Oral every 6 hours PRN Severe Pain (7 - 10)  traMADol 25 milliGRAM(s) Oral every 6 hours PRN Severe Pain (7 - 10)      CAPILLARY BLOOD GLUCOSE        I&O's Summary      PHYSICAL EXAM:  Vital Signs Last 24 Hrs  T(C): 36.8 (28 Jun 2020 12:59), Max: 36.8 (27 Jun 2020 22:13)  T(F): 98.2 (28 Jun 2020 12:59), Max: 98.3 (27 Jun 2020 22:13)  HR: 68 (28 Jun 2020 12:59) (62 - 74)  BP: 144/87 (28 Jun 2020 12:59) (115/71 - 144/87)  BP(mean): --  RR: 18 (28 Jun 2020 12:59) (16 - 18)  SpO2: 99% (28 Jun 2020 12:59) (99% - 100%)    CONSTITUTIONAL: NAD, well-developed  CARDIOVASCULAR: Regular rate and rhythm. Normal S1 and S2. No murmurs.  RESPIRATORY: Normal respiratory effort, Lungs CTAB  EXTREMITIES: No TERRI, peripheral pulses intact.  ABDOMEN: Nontender to palpation, normoactive bowel sounds, no rebound/guarding; No hepatosplenomegaly  MUSCLOSKELETAL: no clubbing or cyanosis of digits; no joint swelling or tenderness to palpation. R hand wrapped. Discoloration of R digit stable throughout day when called to bedside in PM. Numbness stable over course of day. Numbness limited to distal tip of right thumb. Active thumb flexion, extension, abduction, adduction, and opposition relatively intact given limitations with dressing.  PSYCH: A+O to person, place, and time; affect appropriate    LABS:                        10.6   9.83  )-----------( 248      ( 27 Jun 2020 05:25 )             33.7     06-26    136  |  101  |  3<L>  ----------------------------<  133<H>  4.0   |  22  |  0.67    Ca    9.3      26 Jun 2020 16:06    TPro  6.9  /  Alb  3.3  /  TBili  0.2  /  DBili  x   /  AST  11  /  ALT  13  /  AlkPhos  70  06-26              Culture - Abscess with Gram Stain (collected 26 Jun 2020 20:24)  Source: .Abscess thumb - right  Preliminary Report (27 Jun 2020 19:22):    Few Staphylococcus aureus    Culture - Blood (collected 26 Jun 2020 18:21)  Source: .Blood Blood-Peripheral  Preliminary Report (27 Jun 2020 19:01):    No growth to date.    Culture - Blood (collected 26 Jun 2020 18:21)  Source: .Blood Blood  Preliminary Report (27 Jun 2020 19:01):    No growth to date.        RADIOLOGY & ADDITIONAL TESTS:  Results Reviewed:   Imaging Personally Reviewed:  Electrocardiogram Personally Reviewed:    COORDINATION OF CARE:  Care Discussed with Consultants/Other Providers [Y/N]:  Prior or Outpatient Records Reviewed [Y/N]:

## 2020-06-28 NOTE — PROGRESS NOTE ADULT - ASSESSMENT
Assessment/Plan:    Pt w/ R thumb infection s/p bedside drainage now tolerating TID soaks. WBC coming down and exam improving.     - Continue soaks to hand TID  - Continue abx  - follow up cultures    Catrachito Boogie  Plastic Surgery Resident  [facility]: [phone]  [facility]: [id number]

## 2020-06-28 NOTE — PROGRESS NOTE ADULT - PROBLEM SELECTOR PLAN 1
sepsis 2/2 hand cellulitis, elevated WBC of 16.4, ESR and CRP, febrile at home, tachycardia   - vanc/zosyn   - f/u MRI hand r/o osteomyelitis sepsis 2/2 hand cellulitis, elevated WBC of 16.4, ESR and CRP, febrile at home, tachycardia   - C/w vanc/zosyn  - f/u MRI hand r/o osteomyelitis - likely 6/29 sepsis 2/2 hand cellulitis, elevated WBC of 16.4, ESR and CRP, febrile at home, tachycardia   - c/w  vanc/zosyn  - f/u MRI hand r/o osteomyelitis - likely 6/29

## 2020-06-28 NOTE — PROGRESS NOTE ADULT - PROBLEM SELECTOR PLAN 2
- plan as above  - pain management with tylenol and Toradol - plan as above  - pain management with tylenol and Toradol  - Tramadol 25 mg q6h PRN for severe pain

## 2020-06-29 ENCOUNTER — TRANSCRIPTION ENCOUNTER (OUTPATIENT)
Age: 55
End: 2020-06-29

## 2020-06-29 LAB
ANION GAP SERPL CALC-SCNC: 10 MMO/L — SIGNIFICANT CHANGE UP (ref 7–14)
BUN SERPL-MCNC: 17 MG/DL — SIGNIFICANT CHANGE UP (ref 7–23)
CALCIUM SERPL-MCNC: 9 MG/DL — SIGNIFICANT CHANGE UP (ref 8.4–10.5)
CHLORIDE SERPL-SCNC: 105 MMOL/L — SIGNIFICANT CHANGE UP (ref 98–107)
CO2 SERPL-SCNC: 24 MMOL/L — SIGNIFICANT CHANGE UP (ref 22–31)
CREAT SERPL-MCNC: 0.74 MG/DL — SIGNIFICANT CHANGE UP (ref 0.5–1.3)
GLUCOSE SERPL-MCNC: 114 MG/DL — HIGH (ref 70–99)
MAGNESIUM SERPL-MCNC: 2.3 MG/DL — SIGNIFICANT CHANGE UP (ref 1.6–2.6)
PHOSPHATE SERPL-MCNC: 3.8 MG/DL — SIGNIFICANT CHANGE UP (ref 2.5–4.5)
POTASSIUM SERPL-MCNC: 4.5 MMOL/L — SIGNIFICANT CHANGE UP (ref 3.5–5.3)
POTASSIUM SERPL-SCNC: 4.5 MMOL/L — SIGNIFICANT CHANGE UP (ref 3.5–5.3)
SODIUM SERPL-SCNC: 139 MMOL/L — SIGNIFICANT CHANGE UP (ref 135–145)
VANCOMYCIN TROUGH SERPL-MCNC: 16.2 UG/ML — SIGNIFICANT CHANGE UP (ref 10–20)

## 2020-06-29 PROCEDURE — 99232 SBSQ HOSP IP/OBS MODERATE 35: CPT | Mod: GC

## 2020-06-29 RX ORDER — BENZOCAINE AND MENTHOL 5; 1 G/100ML; G/100ML
1 LIQUID ORAL ONCE
Refills: 0 | Status: COMPLETED | OUTPATIENT
Start: 2020-06-29 | End: 2020-06-29

## 2020-06-29 RX ADMIN — TRAMADOL HYDROCHLORIDE 25 MILLIGRAM(S): 50 TABLET ORAL at 23:54

## 2020-06-29 RX ADMIN — GABAPENTIN 300 MILLIGRAM(S): 400 CAPSULE ORAL at 06:40

## 2020-06-29 RX ADMIN — LISINOPRIL 5 MILLIGRAM(S): 2.5 TABLET ORAL at 06:40

## 2020-06-29 RX ADMIN — BENZOCAINE AND MENTHOL 1 LOZENGE: 5; 1 LIQUID ORAL at 18:56

## 2020-06-29 RX ADMIN — GABAPENTIN 300 MILLIGRAM(S): 400 CAPSULE ORAL at 14:27

## 2020-06-29 RX ADMIN — Medication 166.67 MILLIGRAM(S): at 06:32

## 2020-06-29 RX ADMIN — GABAPENTIN 300 MILLIGRAM(S): 400 CAPSULE ORAL at 21:52

## 2020-06-29 RX ADMIN — Medication 166.67 MILLIGRAM(S): at 16:59

## 2020-06-29 NOTE — PROGRESS NOTE ADULT - PROBLEM SELECTOR PLAN 2
- wound cultures grew MRSA continue vanc while inpatient, likely D/C with doxy.   - f/u MR of the hand, has yet to be protocoled by radiology.   - pain management with tylenol and Toradol  - Tramadol 25 mg q6h PRN for severe pain - wound cultures grew MRSA continue vanc while inpatient, likely D/C with bactrim.   - f/u MR of the hand today to r/o osteo  - pain management with tylenol and Toradol  - Tramadol 25 mg q6h PRN for severe pain

## 2020-06-29 NOTE — DISCHARGE NOTE PROVIDER - NSDCMRMEDTOKEN_GEN_ALL_CORE_FT
gabapentin 300 mg oral capsule: 1 cap(s) orally 3 times a day  meloxicam 15 mg oral tablet: 1 tab(s) orally once a day  ramipril 5 mg oral tablet: gabapentin 300 mg oral capsule: 1 cap(s) orally 3 times a day  meloxicam 15 mg oral tablet: 1 tab(s) orally once a day  ramipril 5 mg oral tablet:   sulfamethoxazole-trimethoprim 800 mg-160 mg oral tablet: 2 tab(s) orally 2 times a day until finished

## 2020-06-29 NOTE — DISCHARGE NOTE PROVIDER - CARE PROVIDER_API CALL
Nate Connor  PLASTIC SURGERY  [address] [place] [address]  Phone: [phone]  Fax: [phone]  Follow Up Time: 1 week

## 2020-06-29 NOTE — DISCHARGE NOTE PROVIDER - NSDCHHNEEDSERVICE_GEN_ALL_CORE
Wound care and assessment Observation and assessment/Teaching and training/Wound care and assessment

## 2020-06-29 NOTE — PROGRESS NOTE ADULT - PROBLEM SELECTOR PLAN 1
- Resolved  sepsis 2/2 hand cellulitis, elevated WBC of 16.4, ESR and CRP, febrile at home, tachycardia   - c/w  vanc/zosyn  - f/u MRI hand r/o osteomyelitis - likely 6/29

## 2020-06-29 NOTE — PROGRESS NOTE ADULT - ASSESSMENT
A/P: 54F w R thumb infection s/p bedside drainage now tolerating TID soaks. WBC coming down and exam improving.     - Continue soaks to hand TID  - Continue abx  - follow up cultures  - Ok to d/c home from [facility] perspective  - Please follow up with Dr. Connor within 1-2 weeks after discharge from the hospital. You may call [phone] to schedule an appointment.     Dillon Singh  PGY-5  Plastic Surgery  [id number]

## 2020-06-29 NOTE — DISCHARGE NOTE PROVIDER - NSDCCPCAREPLAN_GEN_ALL_CORE_FT
PRINCIPAL DISCHARGE DIAGNOSIS  Diagnosis: Abscess of thumb, right  Assessment and Plan of Treatment: You came in with swelling and purulent drainage of the right thumb. It was drained by plastic surgery and you were given IV antibiotics for the infection. MRI of the hand showed... PRINCIPAL DISCHARGE DIAGNOSIS  Diagnosis: Abscess of thumb, right  Assessment and Plan of Treatment: You came in with swelling and purulent drainage of the right thumb. It was drained by plastic surgery and you were given IV antibiotics for the infection. MRI of the hand showed no evidence of infection in the bone. You are being sent home with 11 days of antibiotics. 2 pills to be taken twice daily. Please follow up with Dr. Connor (plastic surgeon) within 1-2 weeks.

## 2020-06-29 NOTE — DISCHARGE NOTE PROVIDER - NSDCFUADDAPPT_GEN_ALL_CORE_FT
Please call the plastic surgeon Dr. Nate Connor at [phone] to set up an appointment within 1-2 weeks of discharge    Please follow up with your primary care doctor within 1-2 weeks of discharge.

## 2020-06-29 NOTE — DISCHARGE NOTE PROVIDER - HOSPITAL COURSE
The patient is a 54 year old woman with a history of HTN presenting with right hand swelling. She was found to have purulent discharge. THe patient tried taking keflex as an outpatient which did not help so she came to the hospital. In the hospital the plastic surgery drained the wound and the patient was started on vancomycin and zosyn. The wound cultures grew MRSA. MR of the hand showed The patient is a 54 year old woman with a history of HTN presenting with right hand swelling. She was found to have purulent discharge. The patient tried taking keflex as an outpatient which did not help so she came to the hospital. In the hospital the plastic surgery drained the wound and the patient was started on vancomycin and zosyn. The wound cultures grew MRSA. MRI of the hand showed         Pt is medically optimized for discharge to home. The patient is a 54 year old woman with a history of HTN presenting with right hand swelling. She was found to have purulent discharge. The patient tried taking keflex as an outpatient which did not help so she came to the hospital. In the hospital the plastic surgery drained the wound and the patient was started on vancomycin and zosyn. The wound cultures grew MRSA. MRI of the hand showed swelling and abscess s/p drainage, no current evidence of osteo. the patient is being discharged with bactrim to complete a 14 day course of antibiotics.         Pt is medically optimized for discharge to home.

## 2020-06-30 ENCOUNTER — TRANSCRIPTION ENCOUNTER (OUTPATIENT)
Age: 55
End: 2020-06-30

## 2020-06-30 VITALS
RESPIRATION RATE: 17 BRPM | SYSTOLIC BLOOD PRESSURE: 142 MMHG | TEMPERATURE: 98 F | HEART RATE: 64 BPM | OXYGEN SATURATION: 100 % | DIASTOLIC BLOOD PRESSURE: 90 MMHG

## 2020-06-30 PROCEDURE — 99239 HOSP IP/OBS DSCHRG MGMT >30: CPT | Mod: GC

## 2020-06-30 PROCEDURE — 73220 MRI UPPR EXTREMITY W/O&W/DYE: CPT | Mod: 26,RT

## 2020-06-30 RX ADMIN — LISINOPRIL 5 MILLIGRAM(S): 2.5 TABLET ORAL at 05:56

## 2020-06-30 RX ADMIN — GABAPENTIN 300 MILLIGRAM(S): 400 CAPSULE ORAL at 16:29

## 2020-06-30 RX ADMIN — Medication 166.67 MILLIGRAM(S): at 05:57

## 2020-06-30 RX ADMIN — Medication 600 MILLIGRAM(S): at 10:49

## 2020-06-30 RX ADMIN — GABAPENTIN 300 MILLIGRAM(S): 400 CAPSULE ORAL at 05:56

## 2020-06-30 NOTE — PROGRESS NOTE ADULT - PROBLEM SELECTOR PLAN 5
DVT ppx- hep injections Transitions of Care Status:  1.  Name of PCP:  2.  PCP Contacted on Admission: [ ] Y    [x] N    3.  PCP contacted at Discharge: [ ] Y    [ ] N    [ ] N/A  4.  Post-Discharge Appointment Date and Location:  5.  Summary of Handoff given to PCP:

## 2020-06-30 NOTE — DISCHARGE NOTE NURSING/CASE MANAGEMENT/SOCIAL WORK - PATIENT PORTAL LINK FT
You can access the FollowMyHealth Patient Portal offered by [facility] by registering at the following website: [email or web address]. By joining [facility]’s FollowMyHealth portal, you will also be able to view your health information using other applications (apps) compatible with our system.

## 2020-06-30 NOTE — PROGRESS NOTE ADULT - ASSESSMENT
A/P: 54F w R thumb infection s/p bedside drainage now tolerating TID soaks. WBC coming down and exam improving.     - Continue soaks to hand TID  - Continue abx  - follow up cultures  - Ok to d/c home from PRS perspective  - Please follow up with Dr. Connor within 1-2 weeks after discharge from the hospital. You may call [phone] to schedule an appointment.     Hedy Lyons PGY2  Plastic Surgery  [id number] A/P: 54F w R thumb infection s/p bedside drainage now tolerating TID soaks. WBC coming down and exam improving.     - Continue soaks to hand TID  - Continue abx  - follow up cultures  - Ok to d/c home from PRS perspective  - No concern for osteomyelitis at this time, MRI not required at this time per plastic surgery perspective.   - Please follow up with Dr. Connor within 1-2 weeks after discharge from the hospital. You may call [phone] to schedule an appointment.     Hedy Lyons PGY2  Plastic Surgery  [id number]

## 2020-06-30 NOTE — PROGRESS NOTE ADULT - ASSESSMENT
53 y/o F w/ PMH HTN and arthritis p/w right thumb swelling in the setting of tachycardia, elevated WBC and fever at home most likely sepsis 2/2 cellulitis. Pt currently afebrile with stable vitals wnl. Labs show leukocytosis treated with vanc/zocyn, first dose given in ED. Pain control with Tylenol and Toradol. Pt currently stable. Consult with plastics, will follow. 55 y/o F w/ PMH HTN and arthritis who presented with sepsis 2/2 purulent cellulitis with resolution of leukocytosis, tachycardia and at home fever after five day course of vanc/zosyn. Vitals remain wnl and right thumb has decreased swelling, mild discoloration, areas of pus and clean incisions where I&D was performed on 6/26 by Plastics. Pt awaiting MRI, scheduled for today at noon to r/o osteomyelitis. Pain well controlled with Tylenol and Tramadol

## 2020-06-30 NOTE — PROGRESS NOTE ADULT - SUBJECTIVE AND OBJECTIVE BOX
PROGRESS NOTE:       Patient is a 54y old  Female who presents with a chief complaint of right hand purulent cellulitis (29 Jun 2020 16:10)      SUBJECTIVE / OVERNIGHT EVENTS:    REVIEW OF SYSTEMS:    CONSTITUTIONAL: No weakness, fevers or chills  EYES/ENT: No visual changes;  No vertigo or throat pain   NECK: No pain or stiffness  RESPIRATORY: No cough, wheezing, hemoptysis; No shortness of breath  CARDIOVASCULAR: No chest pain or palpitations  GASTROINTESTINAL: No abdominal or epigastric pain. No nausea, vomiting, or hematemesis; No diarrhea or constipation. No melena or hematochezia.  GENITOURINARY: No dysuria, frequency or hematuria  NEUROLOGICAL: No numbness or weakness  SKIN: No itching, rashes      MEDICATIONS  (STANDING):  gabapentin 300 milliGRAM(s) Oral three times a day  lisinopril 5 milliGRAM(s) Oral daily  polyethylene glycol 3350 17 Gram(s) Oral daily  vancomycin  IVPB 1250 milliGRAM(s) IV Intermittent every 12 hours    MEDICATIONS  (PRN):  acetaminophen   Tablet .. 1000 milliGRAM(s) Oral every 6 hours PRN Moderate Pain (4 - 6)  ibuprofen  Tablet. 600 milliGRAM(s) Oral every 6 hours PRN Severe Pain (7 - 10)  traMADol 25 milliGRAM(s) Oral every 6 hours PRN Severe Pain (7 - 10)      CAPILLARY BLOOD GLUCOSE        I&O's Summary      PHYSICAL EXAM:  Vital Signs Last 24 Hrs  T(C): 36.7 (30 Jun 2020 05:47), Max: 36.9 (29 Jun 2020 21:12)  T(F): 98 (30 Jun 2020 05:47), Max: 98.4 (29 Jun 2020 21:12)  HR: 69 (30 Jun 2020 05:47) (66 - 77)  BP: 127/83 (30 Jun 2020 05:47) (126/78 - 142/90)  BP(mean): --  RR: 17 (30 Jun 2020 05:47) (17 - 18)  SpO2: 99% (30 Jun 2020 05:47) (99% - 100%)    GENERAL: No acute distress, well-developed  HEAD:  Atraumatic, Normocephalic  EYES: EOMI, PERRLA, conjunctiva and sclera clear  NECK: Supple, no lymphadenopathy, no JVD  CHEST/LUNG: CTAB; No wheezes, rales, or rhonchi  HEART: Regular rate and rhythm; No murmurs, rubs, or gallops  ABDOMEN: Soft, non-tender, non-distended; normal bowel sounds, no organomegaly  EXTREMITIES:  2+ peripheral pulses b/l, No clubbing, cyanosis, or edema  NEUROLOGY: A&O x 3, no focal deficits  SKIN: No rashes or lesions    LABS:    06-29    139  |  105  |  17  ----------------------------<  114<H>  4.5   |  24  |  0.74    Ca    9.0      29 Jun 2020 07:30  Phos  3.8     06-29  Mg     2.3     06-29                  RADIOLOGY & ADDITIONAL TESTS:  Results Reviewed:   Imaging Personally Reviewed:  Electrocardiogram Personally Reviewed:    COORDINATION OF CARE:  Care Discussed with Consultants/Other Providers [Y/N]:  Prior or Outpatient Records Reviewed [Y/N]: PROGRESS NOTE:       Patient is a 54y old  Female who presents with a chief complaint of right hand purulent cellulitis (29 Jun 2020 16:10)      SUBJECTIVE / OVERNIGHT EVENTS: Patient was soaking hand this morning. She says that her hand feels less swollen and painful but she was concerned about the discoloration. I reassured her that it was most likely due to broken blood vessels as a result of the swelling. She also c/o sore throat and a hoarse voice. She is worried about getting a cold. Pt denies cough, SOB, n/v/d/c, chills, fever and chest pain.     REVIEW OF SYSTEMS:    CONSTITUTIONAL: No weakness, fevers or chills  EYES/ENT: No visual changes;  No vertigo or throat pain   NECK: throat is sore and voice is raspy   RESPIRATORY: No cough, wheezing, hemoptysis; No shortness of breath  CARDIOVASCULAR: No chest pain or palpitations  GASTROINTESTINAL: No abdominal or epigastric pain. No nausea, vomiting, or hematemesis; No diarrhea or constipation. No melena or hematochezia.  GENITOURINARY: No dysuria, frequency or hematuria  NEUROLOGICAL: No numbness or weakness  SKIN: No itching, rashes      MEDICATIONS  (STANDING):  gabapentin 300 milliGRAM(s) Oral three times a day  lisinopril 5 milliGRAM(s) Oral daily  polyethylene glycol 3350 17 Gram(s) Oral daily  vancomycin  IVPB 1250 milliGRAM(s) IV Intermittent every 12 hours    MEDICATIONS  (PRN):  acetaminophen   Tablet .. 1000 milliGRAM(s) Oral every 6 hours PRN Moderate Pain (4 - 6)  ibuprofen  Tablet. 600 milliGRAM(s) Oral every 6 hours PRN Severe Pain (7 - 10)  traMADol 25 milliGRAM(s) Oral every 6 hours PRN Severe Pain (7 - 10)      CAPILLARY BLOOD GLUCOSE        I&O's Summary      PHYSICAL EXAM:  Vital Signs Last 24 Hrs  T(C): 36.7 (30 Jun 2020 05:47), Max: 36.9 (29 Jun 2020 21:12)  T(F): 98 (30 Jun 2020 05:47), Max: 98.4 (29 Jun 2020 21:12)  HR: 69 (30 Jun 2020 05:47) (66 - 77)  BP: 127/83 (30 Jun 2020 05:47) (126/78 - 142/90)  BP(mean): --  RR: 17 (30 Jun 2020 05:47) (17 - 18)  SpO2: 99% (30 Jun 2020 05:47) (99% - 100%)    GENERAL: No acute distress, well-developed  HEAD:  Atraumatic, Normocephalic  EYES: EOMI, PERRLA, conjunctiva and sclera clear  NECK: Supple, no lymphadenopathy, no JVD  CHEST/LUNG: CTAB; No wheezes, rales, or rhonchi  HEART: Regular rate and rhythm; No murmurs, rubs, or gallops  ABDOMEN: Soft, non-tender, non-distended; normal bowel sounds, no organomegaly  EXTREMITIES:  2+ peripheral pulses b/l, No clubbing, cyanosis, or edema, right hand is less swollen. There is mild discoloration in the thumb and open areas of pus. The stickes came out during her soak today and there is no bleeding at the wound site. are looks clean and nonerythematous  NEUROLOGY: A&O x 3, no focal deficits  SKIN: No rashes or lesions    LABS:    06-29    139  |  105  |  17  ----------------------------<  114<H>  4.5   |  24  |  0.74    Ca    9.0      29 Jun 2020 07:30  Phos  3.8     06-29  Mg     2.3     06-29        RADIOLOGY & ADDITIONAL TESTS:  Results Reviewed:   Imaging Personally Reviewed:  Electrocardiogram Personally Reviewed:    COORDINATION OF CARE:  Care Discussed with Consultants/Other Providers [Y/N]:  Prior or Outpatient Records Reviewed [Y/N]: PROGRESS NOTE:       Patient is a 54y old  Female who presents with a chief complaint of right hand purulent cellulitis (29 Jun 2020 16:10)      SUBJECTIVE / OVERNIGHT EVENTS: Patient was soaking hand this morning. She says that her hand feels less swollen and painful but she was concerned about the discoloration. I reassured her that it was most likely due to broken blood vessels as a result of the swelling. She also c/o sore throat and a hoarse voice. She is worried about getting a cold. Pt denies cough, SOB, n/v/d/c, chills, fever and chest pain.     REVIEW OF SYSTEMS:    CONSTITUTIONAL: No weakness, fevers or chills  EYES/ENT: No visual changes;  No vertigo or throat pain   NECK: throat is sore and voice is raspy   RESPIRATORY: No cough, wheezing, hemoptysis; No shortness of breath  CARDIOVASCULAR: No chest pain or palpitations  GASTROINTESTINAL: No abdominal or epigastric pain. No nausea, vomiting, or hematemesis; No diarrhea or constipation. No melena or hematochezia.  GENITOURINARY: No dysuria, frequency or hematuria  NEUROLOGICAL: No numbness or weakness  SKIN: No itching, rashes      MEDICATIONS  (STANDING):  gabapentin 300 milliGRAM(s) Oral three times a day  lisinopril 5 milliGRAM(s) Oral daily  polyethylene glycol 3350 17 Gram(s) Oral daily  vancomycin  IVPB 1250 milliGRAM(s) IV Intermittent every 12 hours    MEDICATIONS  (PRN):  acetaminophen   Tablet .. 1000 milliGRAM(s) Oral every 6 hours PRN Moderate Pain (4 - 6)  ibuprofen  Tablet. 600 milliGRAM(s) Oral every 6 hours PRN Severe Pain (7 - 10)  traMADol 25 milliGRAM(s) Oral every 6 hours PRN Severe Pain (7 - 10)      CAPILLARY BLOOD GLUCOSE        I&O's Summary      PHYSICAL EXAM:  Vital Signs Last 24 Hrs  T(C): 36.7 (30 Jun 2020 05:47), Max: 36.9 (29 Jun 2020 21:12)  T(F): 98 (30 Jun 2020 05:47), Max: 98.4 (29 Jun 2020 21:12)  HR: 69 (30 Jun 2020 05:47) (66 - 77)  BP: 127/83 (30 Jun 2020 05:47) (126/78 - 142/90)  BP(mean): --  RR: 17 (30 Jun 2020 05:47) (17 - 18)  SpO2: 99% (30 Jun 2020 05:47) (99% - 100%)    GENERAL: No acute distress, well-developed  HEAD:  Atraumatic, Normocephalic  EYES: EOMI, conjunctiva and sclera clear  NECK: Supple, no lymphadenopathy, no JVD  CHEST/LUNG: CTAB; No wheezes, rales, or rhonchi  HEART: Regular rate and rhythm; No murmurs, rubs, or gallops  ABDOMEN: Soft, non-tender, non-distended; normal bowel sounds, no organomegaly  EXTREMITIES:  2+ peripheral pulses b/l, No clubbing, cyanosis, or edema, right hand is less swollen. There is mild discoloration in the thumb and open areas of pus. The stickes came out during her soak today and there is no bleeding at the wound site. are looks clean and nonerythematous  NEUROLOGY: A&O x 3, no focal deficits  SKIN: No rashes    LABS:    06-29    139  |  105  |  17  ----------------------------<  114<H>  4.5   |  24  |  0.74    Ca    9.0      29 Jun 2020 07:30  Phos  3.8     06-29  Mg     2.3     06-29        RADIOLOGY & ADDITIONAL TESTS:  Results Reviewed:   Imaging Personally Reviewed:  Electrocardiogram Personally Reviewed:    COORDINATION OF CARE:  Care Discussed with Consultants/Other Providers [Y/N]:  Prior or Outpatient Records Reviewed [Y/N]:

## 2020-06-30 NOTE — PROGRESS NOTE ADULT - SUBJECTIVE AND OBJECTIVE BOX
ALEXANDRA BAKER  [id number]    Subjective:    Patient seen and examined. Dressing removed this morning, hand appears improved.        Objective:  T(C): 36.7 (06-30-20 @ 05:47), Max: 36.9 (06-29-20 @ 21:12)  HR: 69 (06-30-20 @ 05:47) (66 - 77)  BP: 127/83 (06-30-20 @ 05:47) (126/78 - 142/90)  RR: 17 (06-30-20 @ 05:47) (17 - 18)  SpO2: 99% (06-30-20 @ 05:47) (99% - 100%)  Wt(kg): --   06-29    139  |  105  |  17  ----------------------------<  114<H>  4.5   |  24  |  0.74    Ca    9.0      29 Jun 2020 07:30  Phos  3.8     06-29  Mg     2.3     06-29        PHYSICAL EXAM:    -- CONSTITUTIONAL: AOx3. NAD.   -- EXTREMITIES: R thumb w/ continued improved erythema, edema and ecchymosis also improving, first webspace w/ maceration, drains in place           MEDICATIONS  (STANDING):  gabapentin 300 milliGRAM(s) Oral three times a day  lisinopril 5 milliGRAM(s) Oral daily  polyethylene glycol 3350 17 Gram(s) Oral daily  vancomycin  IVPB 1250 milliGRAM(s) IV Intermittent every 12 hours    MEDICATIONS  (PRN):  acetaminophen   Tablet .. 1000 milliGRAM(s) Oral every 6 hours PRN Moderate Pain (4 - 6)  ibuprofen  Tablet. 600 milliGRAM(s) Oral every 6 hours PRN Severe Pain (7 - 10)  traMADol 25 milliGRAM(s) Oral every 6 hours PRN Severe Pain (7 - 10)

## 2020-06-30 NOTE — DISCHARGE NOTE NURSING/CASE MANAGEMENT/SOCIAL WORK - NSDCFUADDAPPT_GEN_ALL_CORE_FT
Please call the plastic surgeon Dr. Nate Connor at [phone] to set up an appointment within 1-2 weeks of discharge    Please follow up with your primary care doctor within 1-2 weeks of discharge.

## 2020-06-30 NOTE — PROGRESS NOTE ADULT - PROBLEM SELECTOR PLAN 1
- Resolved  sepsis 2/2 hand cellulitis, elevated WBC of 16.4, ESR and CRP, febrile at home, tachycardia   - c/w  vanc/zosyn  - f/u MRI hand r/o osteomyelitis - likely 6/29 - Resolved  sepsis 2/2 hand cellulitis, elevated WBC of 16.4, ESR and CRP, febrile at home, tachycardia   - c/w  vanc/zosyn  - f/u MRI hand r/o osteomyelitis - likely 6/30 - wound cultures grew MRSA, last dose of vanc/zosyn today, d/c on 14 day course of DS 160mg/800mg TMP/SMX PO bid.   - f/u MRI of the hand today to r/o osteo  - pain management with tylenol and Toradol  - Tramadol 25 mg q6h PRN for severe pain

## 2020-06-30 NOTE — PROGRESS NOTE ADULT - ATTENDING COMMENTS
Patient seen and examined by myself , case discussed  with resident ,agree with the above finding and plan  pt feeling better, leucocytosis resolved, f/u MRI of rt hand and blood cx  c/w Abx  Plastic f/u appreciated
Patient seen and examined. Agree with above note by resident.    #Right hand cellulitis and abscess - s/p I/D by plastics. MRI pending to r/o OM. IF MRI neg for OM, DC home on bactrim and with outpt f/u with plastics. Patient reports clinical improvement since admission.    DC home. Time spent 40 mins
Patient seen and examined by myself , case discussed  with resident ,agree with the above finding and plan  pt feeling better, leucocytosis resolved,  Blood cx NGTD, wound cx , with few staph , agree with dc zosyn and c/w vanco , f/u MRI of rt hand   c/w Abx  Plastic f/u appreciated
Patient seen and examined. Agree with above note by resident.    #Right hand cellulitis and abscess - s/p I/D by plastics. MRI pending to r/o OM. IF MRI neg for OM, DC home on bactrim and with outpt f/u with plastics. Patient reports clinical improvement since admission.    DC home. Time spent 40 mins .

## 2020-06-30 NOTE — PROGRESS NOTE ADULT - PROBLEM SELECTOR PLAN 2
- wound cultures grew MRSA continue vanc while inpatient, likely D/C with bactrim.   - f/u MR of the hand today to r/o osteo  - pain management with tylenol and Toradol  - Tramadol 25 mg q6h PRN for severe pain BP's ranging from 127-146/ 78-97, denies HA and vision change.   - continue lisinopril 5mg PO daily

## 2020-06-30 NOTE — PROGRESS NOTE ADULT - REASON FOR ADMISSION
right swollen hand

## 2020-07-01 LAB
CULTURE RESULTS: SIGNIFICANT CHANGE UP
ORGANISM # SPEC MICROSCOPIC CNT: SIGNIFICANT CHANGE UP
ORGANISM # SPEC MICROSCOPIC CNT: SIGNIFICANT CHANGE UP
SPECIMEN SOURCE: SIGNIFICANT CHANGE UP

## 2020-07-10 ENCOUNTER — INPATIENT (INPATIENT)
Facility: HOSPITAL | Age: 55
LOS: 3 days | Discharge: HOME | End: 2020-07-14
Attending: HOSPITALIST | Admitting: HOSPITALIST
Payer: COMMERCIAL

## 2020-07-10 VITALS
TEMPERATURE: 99 F | HEART RATE: 86 BPM | OXYGEN SATURATION: 100 % | DIASTOLIC BLOOD PRESSURE: 48 MMHG | SYSTOLIC BLOOD PRESSURE: 83 MMHG | RESPIRATION RATE: 18 BRPM

## 2020-07-10 DIAGNOSIS — E87.2 ACIDOSIS: ICD-10-CM

## 2020-07-10 DIAGNOSIS — D69.6 THROMBOCYTOPENIA, UNSPECIFIED: ICD-10-CM

## 2020-07-10 DIAGNOSIS — L03.019 CELLULITIS OF UNSPECIFIED FINGER: ICD-10-CM

## 2020-07-10 DIAGNOSIS — R74.0 NONSPECIFIC ELEVATION OF LEVELS OF TRANSAMINASE AND LACTIC ACID DEHYDROGENASE [LDH]: ICD-10-CM

## 2020-07-10 DIAGNOSIS — I95.9 HYPOTENSION, UNSPECIFIED: ICD-10-CM

## 2020-07-10 DIAGNOSIS — N17.9 ACUTE KIDNEY FAILURE, UNSPECIFIED: ICD-10-CM

## 2020-07-10 DIAGNOSIS — A41.9 SEPSIS, UNSPECIFIED ORGANISM: ICD-10-CM

## 2020-07-10 LAB
ACANTHOCYTES BLD QL SMEAR: SLIGHT — SIGNIFICANT CHANGE UP
ALBUMIN SERPL ELPH-MCNC: 2.6 G/DL — LOW (ref 3.3–5)
ALBUMIN SERPL ELPH-MCNC: 3.3 G/DL — SIGNIFICANT CHANGE UP (ref 3.3–5)
ALBUMIN SERPL ELPH-MCNC: 3.4 G/DL — SIGNIFICANT CHANGE UP (ref 3.3–5)
ALP SERPL-CCNC: 64 U/L — SIGNIFICANT CHANGE UP (ref 40–120)
ALP SERPL-CCNC: 65 U/L — SIGNIFICANT CHANGE UP (ref 40–120)
ALP SERPL-CCNC: 79 U/L — SIGNIFICANT CHANGE UP (ref 40–120)
ALT FLD-CCNC: 243 U/L — HIGH (ref 4–33)
ALT FLD-CCNC: 260 U/L — HIGH (ref 4–33)
ALT FLD-CCNC: 350 U/L — HIGH (ref 4–33)
ANION GAP SERPL CALC-SCNC: 10 MMO/L — SIGNIFICANT CHANGE UP (ref 7–14)
ANION GAP SERPL CALC-SCNC: 14 MMO/L — SIGNIFICANT CHANGE UP (ref 7–14)
ANION GAP SERPL CALC-SCNC: 14 MMO/L — SIGNIFICANT CHANGE UP (ref 7–14)
ANISOCYTOSIS BLD QL: SLIGHT — SIGNIFICANT CHANGE UP
APPEARANCE UR: SIGNIFICANT CHANGE UP
AST SERPL-CCNC: 292 U/L — HIGH (ref 4–32)
AST SERPL-CCNC: 305 U/L — HIGH (ref 4–32)
AST SERPL-CCNC: 404 U/L — HIGH (ref 4–32)
BACTERIA # UR AUTO: SIGNIFICANT CHANGE UP
BASE EXCESS BLDV CALC-SCNC: -11.3 MMOL/L — SIGNIFICANT CHANGE UP
BASE EXCESS BLDV CALC-SCNC: -12.3 MMOL/L — SIGNIFICANT CHANGE UP
BASOPHILS # BLD AUTO: 0 K/UL — SIGNIFICANT CHANGE UP (ref 0–0.2)
BASOPHILS NFR BLD AUTO: 0 % — SIGNIFICANT CHANGE UP (ref 0–2)
BASOPHILS NFR SPEC: 0 % — SIGNIFICANT CHANGE UP (ref 0–2)
BILIRUB DIRECT SERPL-MCNC: < 0.2 MG/DL — SIGNIFICANT CHANGE UP (ref 0.1–0.2)
BILIRUB SERPL-MCNC: < 0.2 MG/DL — LOW (ref 0.2–1.2)
BILIRUB UR-MCNC: NEGATIVE — SIGNIFICANT CHANGE UP
BLASTS # FLD: 0 % — SIGNIFICANT CHANGE UP (ref 0–0)
BLOOD GAS VENOUS - CREATININE: 1.74 MG/DL — HIGH (ref 0.5–1.3)
BLOOD GAS VENOUS - CREATININE: 2.79 MG/DL — HIGH (ref 0.5–1.3)
BLOOD GAS VENOUS - FIO2: 21 — SIGNIFICANT CHANGE UP
BLOOD GAS VENOUS - FIO2: 21 — SIGNIFICANT CHANGE UP
BLOOD UR QL VISUAL: NEGATIVE — SIGNIFICANT CHANGE UP
BUN SERPL-MCNC: 28 MG/DL — HIGH (ref 7–23)
BUN SERPL-MCNC: 36 MG/DL — HIGH (ref 7–23)
BUN SERPL-MCNC: 41 MG/DL — HIGH (ref 7–23)
BURR CELLS BLD QL SMEAR: PRESENT — SIGNIFICANT CHANGE UP
CALCIUM SERPL-MCNC: 7.8 MG/DL — LOW (ref 8.4–10.5)
CALCIUM SERPL-MCNC: 7.8 MG/DL — LOW (ref 8.4–10.5)
CALCIUM SERPL-MCNC: 8.5 MG/DL — SIGNIFICANT CHANGE UP (ref 8.4–10.5)
CHLORIDE BLDV-SCNC: 102 MMOL/L — SIGNIFICANT CHANGE UP (ref 96–108)
CHLORIDE BLDV-SCNC: 108 MMOL/L — SIGNIFICANT CHANGE UP (ref 96–108)
CHLORIDE SERPL-SCNC: 101 MMOL/L — SIGNIFICANT CHANGE UP (ref 98–107)
CHLORIDE SERPL-SCNC: 102 MMOL/L — SIGNIFICANT CHANGE UP (ref 98–107)
CHLORIDE SERPL-SCNC: 98 MMOL/L — SIGNIFICANT CHANGE UP (ref 98–107)
CHLORIDE UR-SCNC: 35 MMOL/L — SIGNIFICANT CHANGE UP
CO2 SERPL-SCNC: 12 MMOL/L — LOW (ref 22–31)
CO2 SERPL-SCNC: 14 MMOL/L — LOW (ref 22–31)
CO2 SERPL-SCNC: 7 MMOL/L — CRITICAL LOW (ref 22–31)
COLOR SPEC: SIGNIFICANT CHANGE UP
CREAT SERPL-MCNC: 1.69 MG/DL — HIGH (ref 0.5–1.3)
CREAT SERPL-MCNC: 1.83 MG/DL — HIGH (ref 0.5–1.3)
CREAT SERPL-MCNC: 2.48 MG/DL — HIGH (ref 0.5–1.3)
CRP SERPL-MCNC: 65.5 MG/L — HIGH
EOSINOPHIL # BLD AUTO: 0.01 K/UL — SIGNIFICANT CHANGE UP (ref 0–0.5)
EOSINOPHIL NFR BLD AUTO: 0.2 % — SIGNIFICANT CHANGE UP (ref 0–6)
EOSINOPHIL NFR FLD: 0.9 % — SIGNIFICANT CHANGE UP (ref 0–6)
ERYTHROCYTE [SEDIMENTATION RATE] IN BLOOD: 28 MM/HR — HIGH (ref 4–25)
GAS PNL BLDV: 125 MMOL/L — LOW (ref 136–146)
GAS PNL BLDV: 130 MMOL/L — LOW (ref 136–146)
GIANT PLATELETS BLD QL SMEAR: PRESENT — SIGNIFICANT CHANGE UP
GLUCOSE BLDV-MCNC: 157 MG/DL — HIGH (ref 70–99)
GLUCOSE BLDV-MCNC: 86 MG/DL — SIGNIFICANT CHANGE UP (ref 70–99)
GLUCOSE SERPL-MCNC: 161 MG/DL — HIGH (ref 70–99)
GLUCOSE SERPL-MCNC: 167 MG/DL — HIGH (ref 70–99)
GLUCOSE SERPL-MCNC: 94 MG/DL — SIGNIFICANT CHANGE UP (ref 70–99)
GLUCOSE UR-MCNC: NEGATIVE — SIGNIFICANT CHANGE UP
HCO3 BLDV-SCNC: 14 MMOL/L — LOW (ref 20–27)
HCO3 BLDV-SCNC: 15 MMOL/L — LOW (ref 20–27)
HCT VFR BLD CALC: 34.3 % — LOW (ref 34.5–45)
HCT VFR BLDV CALC: 36.7 % — SIGNIFICANT CHANGE UP (ref 34.5–45)
HCT VFR BLDV CALC: SIGNIFICANT CHANGE UP % (ref 34.5–45)
HGB BLD-MCNC: 11.4 G/DL — LOW (ref 11.5–15.5)
HGB BLDV-MCNC: 11.9 G/DL — SIGNIFICANT CHANGE UP (ref 11.5–15.5)
HGB BLDV-MCNC: SIGNIFICANT CHANGE UP G/DL (ref 11.5–15.5)
IMM GRANULOCYTES NFR BLD AUTO: 0.4 % — SIGNIFICANT CHANGE UP (ref 0–1.5)
KETONES UR-MCNC: NEGATIVE — SIGNIFICANT CHANGE UP
LACTATE BLDV-MCNC: 1.2 MMOL/L — SIGNIFICANT CHANGE UP (ref 0.5–2)
LACTATE BLDV-MCNC: 1.6 MMOL/L — SIGNIFICANT CHANGE UP (ref 0.5–2)
LEUKOCYTE ESTERASE UR-ACNC: NEGATIVE — SIGNIFICANT CHANGE UP
LIDOCAIN IGE QN: 66.9 U/L — HIGH (ref 7–60)
LYMPHOCYTES # BLD AUTO: 0.32 K/UL — LOW (ref 1–3.3)
LYMPHOCYTES # BLD AUTO: 6.2 % — LOW (ref 13–44)
LYMPHOCYTES NFR SPEC AUTO: 1.7 % — LOW (ref 13–44)
MACROCYTES BLD QL: SLIGHT — SIGNIFICANT CHANGE UP
MAGNESIUM SERPL-MCNC: 1.8 MG/DL — SIGNIFICANT CHANGE UP (ref 1.6–2.6)
MAGNESIUM SERPL-MCNC: 2 MG/DL — SIGNIFICANT CHANGE UP (ref 1.6–2.6)
MCHC RBC-ENTMCNC: 29.4 PG — SIGNIFICANT CHANGE UP (ref 27–34)
MCHC RBC-ENTMCNC: 33.2 % — SIGNIFICANT CHANGE UP (ref 32–36)
MCV RBC AUTO: 88.4 FL — SIGNIFICANT CHANGE UP (ref 80–100)
METAMYELOCYTES # FLD: 0 % — SIGNIFICANT CHANGE UP (ref 0–1)
MONOCYTES # BLD AUTO: 0.08 K/UL — SIGNIFICANT CHANGE UP (ref 0–0.9)
MONOCYTES NFR BLD AUTO: 1.5 % — LOW (ref 2–14)
MONOCYTES NFR BLD: 0 % — LOW (ref 2–9)
MYELOCYTES NFR BLD: 0 % — SIGNIFICANT CHANGE UP (ref 0–0)
NEUTROPHIL AB SER-ACNC: 64.3 % — SIGNIFICANT CHANGE UP (ref 43–77)
NEUTROPHILS # BLD AUTO: 4.75 K/UL — SIGNIFICANT CHANGE UP (ref 1.8–7.4)
NEUTROPHILS NFR BLD AUTO: 91.7 % — HIGH (ref 43–77)
NEUTS BAND # BLD: 32.2 % — HIGH (ref 0–6)
NITRITE UR-MCNC: NEGATIVE — SIGNIFICANT CHANGE UP
NRBC # FLD: 0 K/UL — SIGNIFICANT CHANGE UP (ref 0–0)
OSMOLALITY SERPL: 279 MOSMO/KG — SIGNIFICANT CHANGE UP (ref 275–295)
OSMOLALITY UR: 333 MOSMO/KG — SIGNIFICANT CHANGE UP (ref 50–1200)
OTHER - HEMATOLOGY %: 0 — SIGNIFICANT CHANGE UP
OVALOCYTES BLD QL SMEAR: SLIGHT — SIGNIFICANT CHANGE UP
PCO2 BLDV: 37 MMHG — LOW (ref 41–51)
PCO2 BLDV: 39 MMHG — LOW (ref 41–51)
PH BLDV: 7.21 PH — LOW (ref 7.32–7.43)
PH BLDV: 7.22 PH — LOW (ref 7.32–7.43)
PH UR: 6 — SIGNIFICANT CHANGE UP (ref 5–8)
PHOSPHATE SERPL-MCNC: 2.9 MG/DL — SIGNIFICANT CHANGE UP (ref 2.5–4.5)
PLATELET # BLD AUTO: 80 K/UL — LOW (ref 150–400)
PLATELET COUNT - ESTIMATE: SIGNIFICANT CHANGE UP
PMV BLD: 11.4 FL — SIGNIFICANT CHANGE UP (ref 7–13)
PO2 BLDV: 35 MMHG — SIGNIFICANT CHANGE UP (ref 35–40)
PO2 BLDV: 39 MMHG — SIGNIFICANT CHANGE UP (ref 35–40)
POIKILOCYTOSIS BLD QL AUTO: SLIGHT — SIGNIFICANT CHANGE UP
POLYCHROMASIA BLD QL SMEAR: SLIGHT — SIGNIFICANT CHANGE UP
POTASSIUM BLDV-SCNC: 4.3 MMOL/L — SIGNIFICANT CHANGE UP (ref 3.4–4.5)
POTASSIUM BLDV-SCNC: 4.9 MMOL/L — HIGH (ref 3.4–4.5)
POTASSIUM SERPL-MCNC: 4.7 MMOL/L — SIGNIFICANT CHANGE UP (ref 3.5–5.3)
POTASSIUM SERPL-MCNC: 4.7 MMOL/L — SIGNIFICANT CHANGE UP (ref 3.5–5.3)
POTASSIUM SERPL-MCNC: 5.1 MMOL/L — SIGNIFICANT CHANGE UP (ref 3.5–5.3)
POTASSIUM SERPL-SCNC: 4.7 MMOL/L — SIGNIFICANT CHANGE UP (ref 3.5–5.3)
POTASSIUM SERPL-SCNC: 4.7 MMOL/L — SIGNIFICANT CHANGE UP (ref 3.5–5.3)
POTASSIUM SERPL-SCNC: 5.1 MMOL/L — SIGNIFICANT CHANGE UP (ref 3.5–5.3)
POTASSIUM UR-SCNC: 15.7 MMOL/L — SIGNIFICANT CHANGE UP
PROMYELOCYTES # FLD: 0 % — SIGNIFICANT CHANGE UP (ref 0–0)
PROT SERPL-MCNC: 6.5 G/DL — SIGNIFICANT CHANGE UP (ref 6–8.3)
PROT SERPL-MCNC: 6.8 G/DL — SIGNIFICANT CHANGE UP (ref 6–8.3)
PROT SERPL-MCNC: 7.1 G/DL — SIGNIFICANT CHANGE UP (ref 6–8.3)
PROT UR-MCNC: 50 — SIGNIFICANT CHANGE UP
RBC # BLD: 3.88 M/UL — SIGNIFICANT CHANGE UP (ref 3.8–5.2)
RBC # FLD: 15.2 % — HIGH (ref 10.3–14.5)
RBC CASTS # UR COMP ASSIST: SIGNIFICANT CHANGE UP (ref 0–?)
SAO2 % BLDV: 54.3 % — LOW (ref 60–85)
SAO2 % BLDV: 61.7 % — SIGNIFICANT CHANGE UP (ref 60–85)
SARS-COV-2 RNA SPEC QL NAA+PROBE: SIGNIFICANT CHANGE UP
SODIUM SERPL-SCNC: 122 MMOL/L — LOW (ref 135–145)
SODIUM SERPL-SCNC: 123 MMOL/L — LOW (ref 135–145)
SODIUM SERPL-SCNC: 127 MMOL/L — LOW (ref 135–145)
SODIUM UR-SCNC: 65 MMOL/L — SIGNIFICANT CHANGE UP
SP GR SPEC: 1.01 — SIGNIFICANT CHANGE UP (ref 1–1.04)
SQUAMOUS # UR AUTO: SIGNIFICANT CHANGE UP
TROPONIN T, HIGH SENSITIVITY: 6 NG/L — SIGNIFICANT CHANGE UP (ref ?–14)
TROPONIN T, HIGH SENSITIVITY: 6 NG/L — SIGNIFICANT CHANGE UP (ref ?–14)
UROBILINOGEN FLD QL: NORMAL — SIGNIFICANT CHANGE UP
VARIANT LYMPHS # BLD: 0.9 % — SIGNIFICANT CHANGE UP
WBC # BLD: 5.18 K/UL — SIGNIFICANT CHANGE UP (ref 3.8–10.5)
WBC # FLD AUTO: 5.18 K/UL — SIGNIFICANT CHANGE UP (ref 3.8–10.5)
WBC UR QL: HIGH (ref 0–?)

## 2020-07-10 PROCEDURE — 71046 X-RAY EXAM CHEST 2 VIEWS: CPT | Mod: 26

## 2020-07-10 PROCEDURE — 99223 1ST HOSP IP/OBS HIGH 75: CPT | Mod: AI

## 2020-07-10 PROCEDURE — 73140 X-RAY EXAM OF FINGER(S): CPT | Mod: 26,RT

## 2020-07-10 RX ORDER — VANCOMYCIN HCL 1 G
1000 VIAL (EA) INTRAVENOUS ONCE
Refills: 0 | Status: COMPLETED | OUTPATIENT
Start: 2020-07-10 | End: 2020-07-10

## 2020-07-10 RX ORDER — ONDANSETRON 8 MG/1
4 TABLET, FILM COATED ORAL ONCE
Refills: 0 | Status: COMPLETED | OUTPATIENT
Start: 2020-07-10 | End: 2020-07-10

## 2020-07-10 RX ORDER — FAMOTIDINE 10 MG/ML
20 INJECTION INTRAVENOUS ONCE
Refills: 0 | Status: COMPLETED | OUTPATIENT
Start: 2020-07-10 | End: 2020-07-10

## 2020-07-10 RX ORDER — GABAPENTIN 400 MG/1
1 CAPSULE ORAL
Qty: 0 | Refills: 0 | DISCHARGE

## 2020-07-10 RX ORDER — MELOXICAM 15 MG/1
1 TABLET ORAL
Qty: 0 | Refills: 0 | DISCHARGE

## 2020-07-10 RX ORDER — ACETAMINOPHEN 500 MG
1000 TABLET ORAL EVERY 8 HOURS
Refills: 0 | Status: DISCONTINUED | OUTPATIENT
Start: 2020-07-10 | End: 2020-07-11

## 2020-07-10 RX ORDER — SODIUM BICARBONATE 1 MEQ/ML
0.16 SYRINGE (ML) INTRAVENOUS
Qty: 150 | Refills: 0 | Status: DISCONTINUED | OUTPATIENT
Start: 2020-07-10 | End: 2020-07-11

## 2020-07-10 RX ORDER — SODIUM CHLORIDE 9 MG/ML
500 INJECTION, SOLUTION INTRAVENOUS
Refills: 0 | Status: DISCONTINUED | OUTPATIENT
Start: 2020-07-10 | End: 2020-07-11

## 2020-07-10 RX ORDER — MIDODRINE HYDROCHLORIDE 2.5 MG/1
5 TABLET ORAL ONCE
Refills: 0 | Status: COMPLETED | OUTPATIENT
Start: 2020-07-10 | End: 2020-07-10

## 2020-07-10 RX ORDER — CEFEPIME 1 G/1
INJECTION, POWDER, FOR SOLUTION INTRAMUSCULAR; INTRAVENOUS
Refills: 0 | Status: DISCONTINUED | OUTPATIENT
Start: 2020-07-10 | End: 2020-07-11

## 2020-07-10 RX ORDER — HEPARIN SODIUM 5000 [USP'U]/ML
5000 INJECTION INTRAVENOUS; SUBCUTANEOUS EVERY 8 HOURS
Refills: 0 | Status: DISCONTINUED | OUTPATIENT
Start: 2020-07-10 | End: 2020-07-10

## 2020-07-10 RX ORDER — CEFEPIME 1 G/1
1000 INJECTION, POWDER, FOR SOLUTION INTRAMUSCULAR; INTRAVENOUS ONCE
Refills: 0 | Status: COMPLETED | OUTPATIENT
Start: 2020-07-10 | End: 2020-07-10

## 2020-07-10 RX ORDER — CEFEPIME 1 G/1
1000 INJECTION, POWDER, FOR SOLUTION INTRAMUSCULAR; INTRAVENOUS EVERY 12 HOURS
Refills: 0 | Status: DISCONTINUED | OUTPATIENT
Start: 2020-07-11 | End: 2020-07-11

## 2020-07-10 RX ORDER — GABAPENTIN 400 MG/1
300 CAPSULE ORAL THREE TIMES A DAY
Refills: 0 | Status: DISCONTINUED | OUTPATIENT
Start: 2020-07-10 | End: 2020-07-10

## 2020-07-10 RX ORDER — SODIUM CHLORIDE 9 MG/ML
2100 INJECTION INTRAMUSCULAR; INTRAVENOUS; SUBCUTANEOUS ONCE
Refills: 0 | Status: COMPLETED | OUTPATIENT
Start: 2020-07-10 | End: 2020-07-10

## 2020-07-10 RX ORDER — PIPERACILLIN AND TAZOBACTAM 4; .5 G/20ML; G/20ML
3.38 INJECTION, POWDER, LYOPHILIZED, FOR SOLUTION INTRAVENOUS ONCE
Refills: 0 | Status: COMPLETED | OUTPATIENT
Start: 2020-07-10 | End: 2020-07-10

## 2020-07-10 RX ORDER — SODIUM CHLORIDE 9 MG/ML
500 INJECTION INTRAMUSCULAR; INTRAVENOUS; SUBCUTANEOUS ONCE
Refills: 0 | Status: COMPLETED | OUTPATIENT
Start: 2020-07-10 | End: 2020-07-10

## 2020-07-10 RX ADMIN — FAMOTIDINE 20 MILLIGRAM(S): 10 INJECTION INTRAVENOUS at 11:12

## 2020-07-10 RX ADMIN — MIDODRINE HYDROCHLORIDE 5 MILLIGRAM(S): 2.5 TABLET ORAL at 19:41

## 2020-07-10 RX ADMIN — ONDANSETRON 4 MILLIGRAM(S): 8 TABLET, FILM COATED ORAL at 11:12

## 2020-07-10 RX ADMIN — CEFEPIME 100 MILLIGRAM(S): 1 INJECTION, POWDER, FOR SOLUTION INTRAMUSCULAR; INTRAVENOUS at 19:20

## 2020-07-10 RX ADMIN — Medication 1000 MILLIGRAM(S): at 16:48

## 2020-07-10 RX ADMIN — SODIUM CHLORIDE 1000 MILLILITER(S): 9 INJECTION, SOLUTION INTRAVENOUS at 23:22

## 2020-07-10 RX ADMIN — SODIUM CHLORIDE 500 MILLILITER(S): 9 INJECTION INTRAMUSCULAR; INTRAVENOUS; SUBCUTANEOUS at 19:32

## 2020-07-10 RX ADMIN — Medication 250 MILLIGRAM(S): at 13:41

## 2020-07-10 RX ADMIN — SODIUM CHLORIDE 2100 MILLILITER(S): 9 INJECTION INTRAMUSCULAR; INTRAVENOUS; SUBCUTANEOUS at 11:04

## 2020-07-10 RX ADMIN — PIPERACILLIN AND TAZOBACTAM 200 GRAM(S): 4; .5 INJECTION, POWDER, LYOPHILIZED, FOR SOLUTION INTRAVENOUS at 12:26

## 2020-07-10 RX ADMIN — SODIUM CHLORIDE 2100 MILLILITER(S): 9 INJECTION INTRAMUSCULAR; INTRAVENOUS; SUBCUTANEOUS at 17:30

## 2020-07-10 NOTE — ED PROVIDER NOTE - NOTES
Called Dr. Connor's office; spoke with  Quang who states he will page Dr. Connor.  Awaiting call back at this time.

## 2020-07-10 NOTE — H&P ADULT - PROBLEM SELECTOR PLAN 3
,   - fluids   - cont to monitor with CMP q6h ,  likely due to hypotension less likely due to obstructive process  - no need for liver imaging at this time   - cont to monitor with CMP q8h Pt p/w Cr 2.48 (baseline is 0.75) with metabolic acidosis likely due to bacrim use vs infxn  - cont bicarb drip as above   - cont to monitor Cr with CMP q8h  - avoid nephrotoxic agents and renally dose meds

## 2020-07-10 NOTE — ED ADULT NURSE REASSESSMENT NOTE - NS ED NURSE REASSESS COMMENT FT1
Report received from day RN. Pt. A&Ox4, ambulatory with steady gait. Denies dizziness at this time. BP 91/60 at present. Respirations even and unlabored. NSR on cardiac monitor. MICU PA at bedside for ultrasound line. Labs drawn however unable to maintain IV access. Per MICU PA, admitting team to place midline. Will continue to monitor.

## 2020-07-10 NOTE — H&P ADULT - PROBLEM SELECTOR PLAN 6
DVT ppx- hep injections DVT ppx- no hep injections due to low platelet count cause of previous admission two weeks ago as described above  - Plastic surgeon, Dr. Sullivan notified that patient is in hospital and will see pt for further workup, cont with vancomycin dosed by level

## 2020-07-10 NOTE — ED PROVIDER NOTE - CRITICAL CARE PROVIDED
I have personally provided the amount of critical care time documented below, excluding time spent on separate procedures. Full code. I spoke with the consulting service./direct patient care (not related to procedure)/additional history taking/interpretation of diagnostic studies/consultation with other physicians/documentation

## 2020-07-10 NOTE — PROVIDER CONTACT NOTE (OTHER) - RECOMMENDATIONS
Provider confirmed actions RN already performed for infiltration.      Night float to assess and patient will require IV access by admission team. Provider confirmed actions RN already performed.    Night float to assess and patient will require IV access by admission team.

## 2020-07-10 NOTE — H&P ADULT - NSHPSOCIALHISTORY_GEN_ALL_CORE
Pt lives with her  and works as a  for [employer]. She is able to ambulate without assistance. No tobacco use, occasional alcohol use, no other drug use

## 2020-07-10 NOTE — ED ADULT NURSE REASSESSMENT NOTE - NS ED NURSE REASSESS COMMENT FT1
Relief RN: Pt received at 22:30 from primary RN.  Vitals as noted, Pt is alert and oriented and mentating appropriately. Pt denies dizziness weakness.  No signs of hypoperfusion noted.  MICU PA notified of Blood pressure trend.  Per MICU PA:  MICU PA will come to ER to establish Midline IV access d/t multiple RNs MDs and ultrasound team unable to obtain peripheral IV access.  Therefore, meds to be administered once reliable access obtained.  Will monitor closely.

## 2020-07-10 NOTE — PROVIDER CONTACT NOTE (OTHER) - BACKGROUND
Patient admitted for Cellulitis, infection.  Patient is difficult peripheral access & venipuncture. Author RN has attempted 4 times and prior nurse also had attempted multiple times.

## 2020-07-10 NOTE — PROVIDER CONTACT NOTE (OTHER) - BACKGROUND
difficult venipuncture/iv access, currently without access and requiring IV medications/fluids for treatment/ plan of care.

## 2020-07-10 NOTE — H&P ADULT - ASSESSMENT
55 y/o F w/ PMH HTN and arthritis who presented two weeks ago with sepsis 2/2 purulent cellulitis sent home on bactrim who now presents with sepsis most likely due to continuation of R hand cellulitis vs osteomyelitis vs bactrim side effects.     previous admission with right hand cellulitis, wound cultures grew MRSA and was treated with vanc/zosyn with last dose on 6/30, sent home on 14 day course of DS 160mg/800mg TMP/SMX PO bid, MRI of the hand on 6/30 showed mild early sign of osteomyelitis.   - pain management with tylenol and Toradol  - Tramadol 25 mg q6h PRN for severe pain      -- continue ibuprofen 600mg PRN for joint pain 55 y/o F w/ PMH HTN and arthritis who presented two weeks ago with sepsis 2/2 purulent cellulitis sent home on bactrim who now presents with metabolic acidosis most likely due to continuation of R hand cellulitis vs osteomyelitis vs bactrim side effects. 55 y/o F w/ PMH HTN and arthritis who presented two weeks ago with sepsis 2/2 purulent cellulitis sent home on bactrim who now presents with multiorgan dysfunction, metabolic acidosis, and hypotension most likely due to purulent cellulitis vs Bactrim toxicity.

## 2020-07-10 NOTE — H&P ADULT - NSHPLABSRESULTS_GEN_ALL_CORE
11.4   5.18  )-----------( 80       ( 10 Jul 2020 10:03 )             34.3       07-10    122<L>  |  98  |  41<H>  ----------------------------<  167<H>  5.1   |  14<L>  |  2.48<H>    Ca    8.5      10 Jul 2020 10:03    TPro  7.1  /  Alb  3.4  /  TBili  < 0.2<L>  /  DBili  x   /  AST  292<H>  /  ALT  243<H>  /  AlkPhos  65  07-10              Urinalysis Basic - ( 10 Jul 2020 13:22 )    Color: LIGHT YELLOW / Appearance: Lt TURBID / S.015 / pH: 6.0  Gluc: NEGATIVE / Ketone: NEGATIVE  / Bili: NEGATIVE / Urobili: NORMAL   Blood: NEGATIVE / Protein: 50 / Nitrite: NEGATIVE   Leuk Esterase: NEGATIVE / RBC: 3-5 / WBC 6-10   Sq Epi: FEW / Non Sq Epi: x / Bacteria: FEW            Lactate Trend  Blood Gas Venous Comprehensive (07.10.20 @ 10:03)    Blood Gas Venous - Lactate: 1.2: Please note updated reference range. mmol/L    Blood Gas Venous Comprehensive (07.10.20 @ 10:03)    Blood Gas Venous - Lactate: 1.2: Please note updated reference range. mmol/L    Blood Gas Venous - Chloride: 102 mmol/L    Blood Gas Venous - Creatinine: 2.79 mg/dL    pH, Venous: 7.22 pH    pCO2, Venous: 39 mmHg    pO2, Venous: 39 mmHg    HCO3, Venous: 15 mmol/L    Blood Gas Venous - FIO2: 21    Base Excess, Venous: -11.3: REFERENCE RANGE = -3 + 2 mmol/L mmol/L    Oxygen Saturation, Venous: 61.7 %    Blood Gas Venous - Sodium: 125 mmol/L    Blood Gas Venous - Potassium: 4.3 mmol/L    Blood Gas Venous - Glucose: 157 mg/dL    Blood Gas Venous - Hemoglobin: 11.9 g/dL    Blood Gas Venous - Hematocrit: 36.7 %      CAPILLARY BLOOD GLUCOSE          10:03 - VBG - pH: 7.22  | pCO2: 39    | pO2: 39    | Lactate: 1.2        Culture Results:   Few Methicillin resistant Staphylococcus aureus ( @ 20:24)  Culture Results:   No Growth Final ( @ 15:57)  Culture Results:   No Growth Final ( @ 15:57)

## 2020-07-10 NOTE — ED ADULT NURSE REASSESSMENT NOTE - NS ED NURSE REASSESS COMMENT FT1
Author RN assuming covering of patient care.  Patient resting in stretcher, no acute distress noted.  Respirations even/nonlabored, aaox4, Admitted, pending bed assignment. Patient now receiving #2 of 2 NS L boluses. Covering MD Quang Ferro contacted RN and advsised following 2000L IV Fluids NS to recheck labs, then start Sodium Bicarb infusion.  Patient advised to plan.  Safety & comfort measures provided, lights dimmed, will monitor.

## 2020-07-10 NOTE — H&P ADULT - PROBLEM SELECTOR PLAN 2
Pt has no issues with urination, denies back pain, Cr 2.48 (baseline is .75)  - fluids   - cont to monitor Cr with CMP q6h Pt p/w Cr 2.48 (baseline is 0.75) with metabolic acidosis likely due to bacrim use vs infxn  - cont bicarb drip as above   - cont to monitor Cr with CMP q8h  - avoid nephrotoxic agents and renally dose meds BP's 80s/50s in setting of metabolic acidosis, unclear if this is related to infxn vs drug reaction.   - pt getting 2.1 L of NS in ED   - f/u blood cx, urine cx, UA negative  - cont with vancomycin by level and cefepime

## 2020-07-10 NOTE — ED ADULT TRIAGE NOTE - CHIEF COMPLAINT QUOTE
Pt had I&D of finger. Pt states she is having a fever. Sent by MD to r/o out infection. Pt noted to be hypotensive.

## 2020-07-10 NOTE — H&P ADULT - PROBLEM SELECTOR PLAN 4
Plts 80, her baseline is 250's, possible DITP 2/2 bacrtim use, pt did not receive heparin on last admission, therefore no suspicion for HIT  - hold heparin  - continue to monitor with CBC ,  likely due to hypotension less likely due to obstructive process  - no need for liver imaging at this time   - cont to monitor with CMP q8h

## 2020-07-10 NOTE — H&P ADULT - PROBLEM SELECTOR PLAN 1
P/w hypotension, bicarb 14, VBG showing pH of 7.22  -previous admission with right hand cellulitis, wound cultures grew MRSA and was treated with vanc/zosyn with last dose on 6/30, sent home on 14 day course of DS 160mg/800mg TMP/SMX PO bid, MRI of the hand on 6/30 showed mild early sign of osteomyelitis.   - Fluid resuscitation with NS  - vancomycin IV 1g  - Monitor vitals P/w hypotension, bicarb 14, VBG showing pH of 7.22  -previous admission with right hand cellulitis, wound cultures grew MRSA and was treated with vanc/zosyn with last dose on 6/30, sent home on 14 day course of DS 160mg/800mg TMP/SMX PO bid, MRI of the hand on 6/30 showed mild early sign of osteomyelitis.   metabolic acidosis due to renal failure could be due to bactrim  - In ED pt got 2.1 L of NS and 1g vanc and zosyn  - recheck BMP and VBG after fluid resuscitation   - Start bicarb drip 150meq/L at 75cc/hr  - Plan as below for infection   - plan as below for SUE

## 2020-07-10 NOTE — H&P ADULT - NSHPPHYSICALEXAM_GEN_ALL_CORE
PHYSICAL EXAM:  T(C): 36.7 (07-10-20 @ 14:31), Max: 37.2 (07-10-20 @ 08:45)  HR: 74 (07-10-20 @ 14:31) (70 - 86)  BP: 100/54 (07-10-20 @ 14:31) (79/58 - 105/59)  RR: 20 (07-10-20 @ 14:09) (18 - 22)  SpO2: 99% (07-10-20 @ 14:31) (97% - 100%)      GENERAL: NAD, well-developed  HEAD:  Atraumatic, Normocephalic  EYES: EOMI, conjunctiva and sclera clear  NECK: Supple, No JVD  CHEST/LUNG: Clear to auscultation bilaterally; No wheeze  HEART: Regular rate and rhythm; No murmurs, rubs, or gallops  ABDOMEN: Soft, Nontender, Nondistended; Bowel sounds present  EXTREMITIES:  2+ Peripheral Pulses, No clubbing, cyanosis, or edema  PSYCH: AAOx3  NEUROLOGY: non-focal  SKIN: erythematous rash nonraised on anterior neck

## 2020-07-10 NOTE — PROVIDER CONTACT NOTE (OTHER) - ASSESSMENT
Patient R Upper arm swollen from site of infiltration.  IV cath removed intact.  Hot packs applied. Arm elevated and site wrapped Patient R Upper arm swollen. IV cath removed intact.  Hot packs applied. Arm elevated and site wrapped

## 2020-07-10 NOTE — H&P ADULT - PROBLEM SELECTOR PLAN 7
Transitions of Care Status:  1.  Name of PCP:  2.  PCP Contacted on Admission: [ ] Y    [x] N    3.  PCP contacted at Discharge: [ ] Y    [ ] N    [ ] N/A  4.  Post-Discharge Appointment Date and Location:  5.  Summary of Handoff given to PCP: DVT ppx- SCD

## 2020-07-10 NOTE — ED ADULT NURSE REASSESSMENT NOTE - NS ED NURSE REASSESS COMMENT FT1
Multiple us guided IV attempts made by Med Team 4 and ED team.  MD Augustin ED ATTD placed USG IV to R Upper Arm.  Unable to obtain enough draw back for labs that were ordered.  Med Team 4 intern at bedside aware, speaking to senior resident Edgar Feror, to hold labs at this time.  Patient BP hypotensive 80/60s at present.  Per admitting team, will give additional IV fluids at this time, meds for BP. Will have overnight team reassess patient for possible arterial lab draw vs MICU consultation for BP and Sodium.

## 2020-07-10 NOTE — ED ADULT NURSE REASSESSMENT NOTE - NS ED NURSE REASSESS COMMENT FT1
Patient IV no longer working in L upper arm.  Unable to flush, causes discomfort when flushed.  Removed intact.  Placed new IV to L hand #22g.  Medications continued.

## 2020-07-10 NOTE — ED PROVIDER NOTE - PROGRESS NOTE DETAILS
STEPHANY NORIEGA:  Pt accepted to Dr. Mayorga.  MAR text paged at this time.  Rpt bmp pending. STEPHANY NORIEGA:  Notified by ED Kaity Ferrara of tele monitor event.  Pt appears to have been in bigeminy.  Magnesium serum added onto lab work.  Event discussed with Dr. Winston at this time.  Notified Dr. Mayorga who agrees with admission to tele bed.

## 2020-07-10 NOTE — ED PROVIDER NOTE - SKIN LOCATION #1
right hand 1st digit:  erythema, mild warmth; nontender; no crepitus; s/p I&D with granulation tissue; no active drainage; FROM of digit

## 2020-07-10 NOTE — ED PROVIDER NOTE - OBJECTIVE STATEMENT
Pt is a 53 y/o F PMHx HTN, recent right hand 1st digit abscess s/p I&D p/w fevers x three days.  Pt states she has had fevers Tmax 100.6 for past three days.  She spoke with Dr. Connor on the phone yesterday who urged her to go to urgent care yesterday.  She again called Dr. Connor today who directed pt to ED this morning.  She reports she had dressing changed by Dr. Connor 4 days ago.  She notes she has been compliant with Bactrim.  Since yesterday, pt reports mild substernal and epigastric aching pain, nonradiating, nonpleuritic, nonexertional, nonpositional, which she attributes to nausea 2/2 Bactrim.  Pt notes burning dysuria.  Pt denies any headache, SOB, palpitations, cough, sore throat, diarrhea, constipation, neck pain/stiffness, dizziness, lightheadedness, or any other specific complaints.

## 2020-07-10 NOTE — ED ADULT NURSE NOTE - OBJECTIVE STATEMENT
Receive pt in sport 26 alert and oriented x 4  c/o R H 1st digit  abscess, abd pain and fever.   Pt s/p I&D , drsg change 4 days ago.  Denies chest pain, sob, dizziness, cough, diarrhea. Resp unlabored  IV 20 G placed.  Labs sent

## 2020-07-10 NOTE — ED ADULT NURSE REASSESSMENT NOTE - NS ED NURSE REASSESS COMMENT FT1
Midline IV access obtained at R.biceps by MICU PA, line is patent fluids flowing well with no signs of infiltration.  Report given to ANANT Sanford, Pt placed in transport system to [facility].  Note that further IV access if needed should be obtained by ultrasound at the least or consult MICU PA [id number].

## 2020-07-10 NOTE — H&P ADULT - NSHPREVIEWOFSYSTEMS_GEN_ALL_CORE
REVIEW OF SYSTEMS:    CONSTITUTIONAL: 3 days of chills, fever and weakness   EYES/ENT: No visual changes;  No vertigo or throat pain   NECK: No pain or stiffness  BACK: no pain or lesions   RESPIRATORY: No cough, wheezing, hemoptysis; No shortness of breath  CARDIOVASCULAR: complains of chest pain  GASTROINTESTINAL: No abdominal or epigastric pain. No nausea, vomiting, or hematemesis; No diarrhea or constipation. No melena or hematochezia.  GENITOURINARY: No dysuria, frequency or hematuria  NEUROLOGICAL: No numbness or weakness  EXTREMITIES: no varicose veins, no pain in UE and LE  SKIN: Notes a rash on her neck that she attributes to her fever

## 2020-07-10 NOTE — PROVIDER CONTACT NOTE (OTHER) - SITUATION
Patient USG IV infiltrated following ~100cc NS & Cefepime Patient USG IV no longer functional, site swollen and uncomfortable.

## 2020-07-10 NOTE — H&P ADULT - PROBLEM SELECTOR PLAN 5
- continue ibuprofen 600mg PRN for joint pain cause of previous admission two weeks ago as described above  - Plastic surgeon, Dr. Sullivan notified that patient is in hospital and will see pt for further workup, cont with vancomycin dosed by level Plts 80, her baseline is 250's, possible DITP 2/2 bacrtim use, pt did not receive heparin on last admission, therefore no suspicion for HIT  - hold heparin  - continue to monitor with CBC

## 2020-07-10 NOTE — PROVIDER CONTACT NOTE (OTHER) - REASON
SUBJECTIVE:   Pina De Leon is a 29 year old female who presents to clinic today for the following   health issues:    Patient understands that this appointment was for ED follow up and she needs to make appointment to get established with PCP who are taking new patients .    ED/UC Followup:    Facility:   ED  Date of visit: 05/02/2019  Reason for visit: Acute pancreatitis, unspecified complication status, unspecified pancreatitis type  Current Status: Dischared     Patient presented to ED on 5/2/2019 for evaluation of abdominal pain  Dr. Arthur suspected pancreatitis  Likely alcohol induced    She was given IV fluids  And recommended bowel rest  She was also given hydrocodone for pain management  And Zofran for nausea   She has a hx of pancreatitis  Last had an episode in 2013  Had various episodes between [id number]  Episodes were triggered by alcohol consumption   She was a heavy drinker in the past  And had since drastically cut down on consumption  She relapsed and had alcoholic drinks the few days leading up to recent ED visit  Since ED discharge, she has considered following up with Dr. Michelle   Due to ongoing abdominal pain  Worse after eating  She is able to tolerate eating  She is on the BRAT diet  She was given hydrocodone for pain  And was told to increase usage to 2 tabs daily  However pain did not improve    Menorrhagia   Hx of heavy periods  Last 8 days  She was seen for this in the past  She was not started or recommended any treatment  She wishes to have another child  Patient has a hx of abnormal pap smear  Pap smear on 3/22/2018 with ASCUS  Negative for HPV    Elevated BP  BP elevated today, 144/96  She does not check BP outside of clinic    ADHD  She follows with Dr. Doherty of psychiatry   For ADHD and depression    She does not have a PCP  Recently moved to [place]  Lived in [facility] prior      Additional history: as documented    Reviewed  and updated as needed  Patient's USG IV Infiltrated "this visit by clinical staff  Tobacco  Allergies  Meds         Reviewed and updated as needed this visit by Provider         Current Outpatient Medications   Medication Sig Dispense Refill     amphetamine-dextroamphetamine (ADDERALL XR) 20 MG 24 hr capsule TK 1 C PO ONCE D IN THE MORNING  0     blood glucose (NO BRAND SPECIFIED) lancets standard Use to test blood sugar 3 times daily or as directed. 1 Box 0     blood glucose monitoring (NO BRAND SPECIFIED) meter device kit Use to test blood sugar 3 times daily or as directed. 1 kit 0     blood glucose monitoring (NO BRAND SPECIFIED) test strip Use to test blood sugars 3 times daily or as directed 1 Box 1     Cholecalciferol (VITAMIN D3 PO) Take by mouth daily       HYDROcodone-acetaminophen (NORCO) 5-325 MG tablet Take 1 tablet by mouth every 6 hours as needed 30 tablet 0     ondansetron (ZOFRAN ODT) 4 MG ODT tab Take 1 tablet (4 mg) by mouth every 8 hours as needed 15 tablet 1     venlafaxine (EFFEXOR-XR) 150 MG 24 hr capsule TK ONE C PO QD  0     Labs reviewed in EPIC    ROS:  Constitutional, HEENT, cardiovascular, pulmonary, GI, , musculoskeletal, neuro, skin, endocrine and psych systems are negative, except as otherwise noted.    This document serves as a record of the services and decisions personally performed and made by Madelyn Herrera MD. It was created on his behalf by Sophia Salmeron, a trained medical scribe. The creation of this document is based on the provider's statements to the medical scribe.  Sophia Salmeron May 6, 2019 2:52 PM    OBJECTIVE:     BP (!) 144/96 (BP Location: Right arm, Patient Position: Sitting, Cuff Size: Adult Regular)   Pulse 115   Ht 1.6 m (5' 3\")   Wt 66.2 kg (146 lb)   SpO2 97%   Breastfeeding? No   BMI 25.86 kg/m    Body mass index is 25.86 kg/m .     GENERAL: healthy, alert and no distress  ABDOMEN: soft, nontender, no hepatosplenomegaly, no masses and bowel sounds normal    Diagnostic Test Results:  No results found " for this or any previous visit (from the past 24 hour(s)).    ASSESSMENT/PLAN:   Pina was seen today for hospital f/u.    Diagnoses and all orders for this visit:    Recurrent pancreatitis (H)  Patient presented to ED on 5/2/2019 for evaluation of abdominal pain  Dr. Arthur suspected pancreatitis  Likely alcohol induced    She was given IV fluids  And recommended bowel rest  She was also given hydrocodone for pain management  And Zofran for nausea   She has a hx of pancreatitis  Last had an episode in 2013  Had various episodes between 0216-1086  Episodes were triggered by alcohol consumption   She was a heavy drinker in the past  And had since drastically cut down on consumption and was sober  She relapsed and had alcoholic drinks the few days leading up to recent ED visit  Since ED discharge, she has considered following up with Dr. Michelle   Due to ongoing abdominal pain  Worse after eating  She is able to tolerate eating  She is on the BRAT diet  She was given hydrocodone for pain  And was told to increase usage to 2 tabs daily  However pain did not improve  Refilled oxycodone  Reviewed that oxycodone is a controlled substance   And should only be taken as prescribed  Recommended Zantac 150 mg daily  Advised to follow up if symptoms do not improve  -     Lipase  -     HYDROcodone-acetaminophen (NORCO) 5-325 MG tablet; Take 1 tablet by mouth every 6 hours as needed  Educated about opiods  She understand it is given for short term  I will not continue to prescribe this   As she has history of pancreatitis and had pain and cannot take NSAIDS so it is given  Patient was educated about opiod pain medication. It can be habit-forming. It should be taken as prescribed. Do not mix it  with alcohol. Be careful with driving.Do not loose the  Prescription.  Do not overuse this medication. It is a controlled substance.  No imaging ordered as patient is clinically stable   But can be ordered if needed       History  of alcohol use disorder  Recently relapsed  But now sober again    Menorrhagia with irregular cycle  Hx of heavy periods  Last 8 days  She was seen for this in the past  She was not started or recommended any treatment  She wishes to have another child  Patient has a hx of abnormal pap smear  Pap smear on 3/22/2018 with ASCUS  Negative for HPV  Educated patient that she is at risk of becoming anemic if left untreated  Discussed that most treatment options include contraceptives  Encouraged patient follow up with OBGYN to address this  CBC today  -     CBC with platelets  -     OB/GYN REFERRAL    Screening for deficiency anemia  -     CBC with platelets  -     Ferritin    Nausea  -     ondansetron (ZOFRAN ODT) 4 MG ODT tab; Take 1 tablet (4 mg) by mouth every 8 hours as needed      Patient Instructions   Labs today  I refilled your norco due to ongoing abdominal pain  Establish care with a PCP  Make appointment with new PCP   Make appointment with gynecologist to discuss your heavy menses  If left untreated you are at risk of becoming anemic  norco  can be habit-forming. It should be taken as prescribed. Do not mix it  with alcohol. Be careful with driving.Do not loose the  Prescription.  Do not overuse this medication. It is a controlled substance.  Take OTC zantac 150 mg twice daily          The information in this document, created by the medical scribe for me, accurately reflects the services I personally performed and the decisions made by me. I have reviewed and approved this document for accuracy prior to leaving the patient care area.  May 6, 2019 3:24 PM    Madelyn Herrera MD  [facility]

## 2020-07-10 NOTE — ED PROVIDER NOTE - CLINICAL SUMMARY MEDICAL DECISION MAKING FREE TEXT BOX
Tish: F x 3 days, recent thumb MRSA abscess s/p I & D and Abx. BP 80/50, HR 87. Concern for severe sepsis. Thumb w/ erythema, edema, and ecchymoses. Able to move and feel it. NVI. Epig pain. Dysuria. Concern for sepsis, perhaps 2/2 SSTI vs. UTI. Plan: IVF, lactate, UA/UCx, BCx, Vanc and GNR-covering ABx, CXR and EKG. Admit.

## 2020-07-10 NOTE — ED ADULT NURSE NOTE - NSIMPLEMENTINTERV_GEN_ALL_ED
Implemented All Universal Safety Interventions:  [place] to call system. Call bell, personal items and telephone within reach. Instruct patient to call for assistance. Room bathroom lighting operational. Non-slip footwear when patient is off stretcher. Physically safe environment: no spills, clutter or unnecessary equipment. Stretcher in lowest position, wheels locked, appropriate side rails in place.

## 2020-07-10 NOTE — H&P ADULT - HISTORY OF PRESENT ILLNESS
Pt is a 53 y/o F PMHx HTN, recent right hand 1st digit abscess s/p I&D p/w fevers x three days.  Pt states she has had fevers Tmax 103 for past three days.  She spoke with Dr. Connor on the phone yesterday who urged her to go to urgent care yesterday.  She again called Dr. Connor today who directed pt to ED this morning.  She reports she had dressing changed by Dr. Connor 4 days ago.  She notes she has been compliant with Bactrim.  Since yesterday, pt reports mild substernal and epigastric aching pain, nonradiating, nonpleuritic, nonexertional, nonpositional, which she attributes to nausea 2/2 Bactrim.  Pt notes burning dysuria.  Pt denies any headache, SOB, palpitations, cough, sore throat, diarrhea, constipation, neck pain/stiffness, dizziness, lightheadedness, or any other specific complaints.

## 2020-07-10 NOTE — ED PROVIDER NOTE - ATTENDING CONTRIBUTION TO CARE
I performed a face-to-face evaluation of the patient and performed a history and physical examination. I agree with the history and physical examination.    F x 3 days, recent thumb MRSA abscess s/p I & D and Abx. BP 80/50, HR 87. Concern for severe sepsis. Thumb w/ erythema, edema, and ecchymoses. Able to move and feel it. NVI. Epig pain. Dysuria. Concern for sepsis, perhaps 2/2 SSTI vs. UTI. Plan: IVF, lactate, UA/UCx, BCx, Vanc and GNR-covering ABx, CXR and EKG. Admit.

## 2020-07-10 NOTE — ED ADULT NURSE REASSESSMENT NOTE - NS ED NURSE REASSESS COMMENT FT1
Patient IV accidentally fell out.  Patient without venous access.  Author RN previously attempted multiple venous access and author RN also.  MD Padgett paged and awaiting call back for patient requires USG IV access and repeat lab work.

## 2020-07-11 DIAGNOSIS — E87.1 HYPO-OSMOLALITY AND HYPONATREMIA: ICD-10-CM

## 2020-07-11 DIAGNOSIS — E87.2 ACIDOSIS: ICD-10-CM

## 2020-07-11 LAB
ALBUMIN SERPL ELPH-MCNC: 2.8 G/DL — LOW (ref 3.3–5)
ALBUMIN SERPL ELPH-MCNC: 2.8 G/DL — LOW (ref 3.3–5)
ALP SERPL-CCNC: 74 U/L — SIGNIFICANT CHANGE UP (ref 40–120)
ALP SERPL-CCNC: 95 U/L — SIGNIFICANT CHANGE UP (ref 40–120)
ALT FLD-CCNC: 342 U/L — HIGH (ref 4–33)
ALT FLD-CCNC: 372 U/L — HIGH (ref 4–33)
ANION GAP SERPL CALC-SCNC: 10 MMO/L — SIGNIFICANT CHANGE UP (ref 7–14)
ANION GAP SERPL CALC-SCNC: 8 MMO/L — SIGNIFICANT CHANGE UP (ref 7–14)
APTT BLD: 32.7 SEC — SIGNIFICANT CHANGE UP (ref 27–36.3)
AST SERPL-CCNC: 460 U/L — HIGH (ref 4–32)
AST SERPL-CCNC: 502 U/L — HIGH (ref 4–32)
BASE EXCESS BLDV CALC-SCNC: -5.4 MMOL/L — SIGNIFICANT CHANGE UP
BASOPHILS # BLD AUTO: 0 K/UL — SIGNIFICANT CHANGE UP (ref 0–0.2)
BASOPHILS NFR BLD AUTO: 0 % — SIGNIFICANT CHANGE UP (ref 0–2)
BILIRUB SERPL-MCNC: < 0.2 MG/DL — LOW (ref 0.2–1.2)
BILIRUB SERPL-MCNC: < 0.2 MG/DL — LOW (ref 0.2–1.2)
BLOOD GAS VENOUS - CREATININE: 1.29 MG/DL — SIGNIFICANT CHANGE UP (ref 0.5–1.3)
BUN SERPL-MCNC: 19 MG/DL — SIGNIFICANT CHANGE UP (ref 7–23)
BUN SERPL-MCNC: 24 MG/DL — HIGH (ref 7–23)
CALCIUM SERPL-MCNC: 7.4 MG/DL — LOW (ref 8.4–10.5)
CALCIUM SERPL-MCNC: 7.7 MG/DL — LOW (ref 8.4–10.5)
CHLORIDE BLDV-SCNC: 106 MMOL/L — SIGNIFICANT CHANGE UP (ref 96–108)
CHLORIDE SERPL-SCNC: 101 MMOL/L — SIGNIFICANT CHANGE UP (ref 98–107)
CHLORIDE SERPL-SCNC: 105 MMOL/L — SIGNIFICANT CHANGE UP (ref 98–107)
CK SERPL-CCNC: 120 U/L — SIGNIFICANT CHANGE UP (ref 25–170)
CO2 SERPL-SCNC: 16 MMOL/L — LOW (ref 22–31)
CO2 SERPL-SCNC: 16 MMOL/L — LOW (ref 22–31)
CORTIS SERPL-MCNC: 8.7 UG/DL — SIGNIFICANT CHANGE UP (ref 2.7–18.4)
CREAT SERPL-MCNC: 1.17 MG/DL — SIGNIFICANT CHANGE UP (ref 0.5–1.3)
CREAT SERPL-MCNC: 1.49 MG/DL — HIGH (ref 0.5–1.3)
CULTURE RESULTS: SIGNIFICANT CHANGE UP
EOSINOPHIL # BLD AUTO: 0.03 K/UL — SIGNIFICANT CHANGE UP (ref 0–0.5)
EOSINOPHIL NFR BLD AUTO: 1.2 % — SIGNIFICANT CHANGE UP (ref 0–6)
GAS PNL BLDV: 128 MMOL/L — LOW (ref 136–146)
GLUCOSE BLDV-MCNC: 114 MG/DL — HIGH (ref 70–99)
GLUCOSE SERPL-MCNC: 104 MG/DL — HIGH (ref 70–99)
GLUCOSE SERPL-MCNC: 123 MG/DL — HIGH (ref 70–99)
HCO3 BLDV-SCNC: 20 MMOL/L — SIGNIFICANT CHANGE UP (ref 20–27)
HCT VFR BLD CALC: 30.4 % — LOW (ref 34.5–45)
HCT VFR BLDV CALC: 32.8 % — LOW (ref 34.5–45)
HGB BLD-MCNC: 10.1 G/DL — LOW (ref 11.5–15.5)
HGB BLDV-MCNC: 10.6 G/DL — LOW (ref 11.5–15.5)
IMM GRANULOCYTES NFR BLD AUTO: 0.4 % — SIGNIFICANT CHANGE UP (ref 0–1.5)
INR BLD: 1.11 — SIGNIFICANT CHANGE UP (ref 0.88–1.17)
LACTATE BLDV-MCNC: 1.9 MMOL/L — SIGNIFICANT CHANGE UP (ref 0.5–2)
LACTATE BLDV-MCNC: 1.9 MMOL/L — SIGNIFICANT CHANGE UP (ref 0.5–2)
LYMPHOCYTES # BLD AUTO: 0.42 K/UL — LOW (ref 1–3.3)
LYMPHOCYTES # BLD AUTO: 16.3 % — SIGNIFICANT CHANGE UP (ref 13–44)
MAGNESIUM SERPL-MCNC: 1.6 MG/DL — SIGNIFICANT CHANGE UP (ref 1.6–2.6)
MAGNESIUM SERPL-MCNC: 1.7 MG/DL — SIGNIFICANT CHANGE UP (ref 1.6–2.6)
MCHC RBC-ENTMCNC: 29 PG — SIGNIFICANT CHANGE UP (ref 27–34)
MCHC RBC-ENTMCNC: 33.2 % — SIGNIFICANT CHANGE UP (ref 32–36)
MCV RBC AUTO: 87.4 FL — SIGNIFICANT CHANGE UP (ref 80–100)
MONOCYTES # BLD AUTO: 0.07 K/UL — SIGNIFICANT CHANGE UP (ref 0–0.9)
MONOCYTES NFR BLD AUTO: 2.7 % — SIGNIFICANT CHANGE UP (ref 2–14)
NEUTROPHILS # BLD AUTO: 2.05 K/UL — SIGNIFICANT CHANGE UP (ref 1.8–7.4)
NEUTROPHILS NFR BLD AUTO: 79.4 % — HIGH (ref 43–77)
NRBC # FLD: 0 K/UL — SIGNIFICANT CHANGE UP (ref 0–0)
PCO2 BLDV: 32 MMHG — LOW (ref 41–51)
PH BLDV: 7.39 PH — SIGNIFICANT CHANGE UP (ref 7.32–7.43)
PHOSPHATE SERPL-MCNC: 1.5 MG/DL — LOW (ref 2.5–4.5)
PHOSPHATE SERPL-MCNC: 2.8 MG/DL — SIGNIFICANT CHANGE UP (ref 2.5–4.5)
PLATELET # BLD AUTO: 75 K/UL — LOW (ref 150–400)
PMV BLD: 11.1 FL — SIGNIFICANT CHANGE UP (ref 7–13)
PO2 BLDV: 28 MMHG — LOW (ref 35–40)
POTASSIUM BLDV-SCNC: 4.3 MMOL/L — SIGNIFICANT CHANGE UP (ref 3.4–4.5)
POTASSIUM SERPL-MCNC: 4.3 MMOL/L — SIGNIFICANT CHANGE UP (ref 3.5–5.3)
POTASSIUM SERPL-MCNC: 4.4 MMOL/L — SIGNIFICANT CHANGE UP (ref 3.5–5.3)
POTASSIUM SERPL-SCNC: 4.3 MMOL/L — SIGNIFICANT CHANGE UP (ref 3.5–5.3)
POTASSIUM SERPL-SCNC: 4.4 MMOL/L — SIGNIFICANT CHANGE UP (ref 3.5–5.3)
PROT SERPL-MCNC: 5.2 G/DL — LOW (ref 6–8.3)
PROT SERPL-MCNC: 5.3 G/DL — LOW (ref 6–8.3)
PROTHROM AB SERPL-ACNC: 12.8 SEC — SIGNIFICANT CHANGE UP (ref 9.8–13.1)
RBC # BLD: 3.48 M/UL — LOW (ref 3.8–5.2)
RBC # FLD: 15 % — HIGH (ref 10.3–14.5)
SAO2 % BLDV: 51.6 % — LOW (ref 60–85)
SARS-COV-2 IGG SERPL QL IA: POSITIVE
SARS-COV-2 IGM SERPL IA-ACNC: 135 INDEX — HIGH
SODIUM SERPL-SCNC: 127 MMOL/L — LOW (ref 135–145)
SODIUM SERPL-SCNC: 129 MMOL/L — LOW (ref 135–145)
SPECIMEN SOURCE: SIGNIFICANT CHANGE UP
TSH SERPL-MCNC: 0.16 UIU/ML — LOW (ref 0.27–4.2)
VANCOMYCIN FLD-MCNC: 9.5 UG/ML — SIGNIFICANT CHANGE UP
WBC # BLD: 2.58 K/UL — LOW (ref 3.8–10.5)
WBC # FLD AUTO: 2.58 K/UL — LOW (ref 3.8–10.5)

## 2020-07-11 PROCEDURE — 99254 IP/OBS CNSLTJ NEW/EST MOD 60: CPT | Mod: GC

## 2020-07-11 PROCEDURE — 99233 SBSQ HOSP IP/OBS HIGH 50: CPT | Mod: GC

## 2020-07-11 RX ORDER — SODIUM CHLORIDE 9 MG/ML
1000 INJECTION, SOLUTION INTRAVENOUS
Refills: 0 | Status: DISCONTINUED | OUTPATIENT
Start: 2020-07-11 | End: 2020-07-13

## 2020-07-11 RX ORDER — ACETAMINOPHEN 500 MG
1000 TABLET ORAL EVERY 8 HOURS
Refills: 0 | Status: DISCONTINUED | OUTPATIENT
Start: 2020-07-11 | End: 2020-07-11

## 2020-07-11 RX ORDER — SODIUM CHLORIDE 9 MG/ML
500 INJECTION, SOLUTION INTRAVENOUS ONCE
Refills: 0 | Status: COMPLETED | OUTPATIENT
Start: 2020-07-11 | End: 2020-07-11

## 2020-07-11 RX ORDER — VANCOMYCIN HCL 1 G
1250 VIAL (EA) INTRAVENOUS ONCE
Refills: 0 | Status: COMPLETED | OUTPATIENT
Start: 2020-07-11 | End: 2020-07-11

## 2020-07-11 RX ORDER — MIDODRINE HYDROCHLORIDE 2.5 MG/1
5 TABLET ORAL ONCE
Refills: 0 | Status: COMPLETED | OUTPATIENT
Start: 2020-07-11 | End: 2020-07-11

## 2020-07-11 RX ORDER — ACETAMINOPHEN 500 MG
1000 TABLET ORAL ONCE
Refills: 0 | Status: COMPLETED | OUTPATIENT
Start: 2020-07-11 | End: 2020-07-11

## 2020-07-11 RX ORDER — BENZOCAINE AND MENTHOL 5; 1 G/100ML; G/100ML
1 LIQUID ORAL ONCE
Refills: 0 | Status: COMPLETED | OUTPATIENT
Start: 2020-07-11 | End: 2020-07-11

## 2020-07-11 RX ADMIN — SODIUM CHLORIDE 1000 MILLILITER(S): 9 INJECTION, SOLUTION INTRAVENOUS at 02:31

## 2020-07-11 RX ADMIN — BENZOCAINE AND MENTHOL 1 LOZENGE: 5; 1 LIQUID ORAL at 01:24

## 2020-07-11 RX ADMIN — SODIUM CHLORIDE 500 MILLILITER(S): 9 INJECTION, SOLUTION INTRAVENOUS at 09:28

## 2020-07-11 RX ADMIN — CEFEPIME 100 MILLIGRAM(S): 1 INJECTION, POWDER, FOR SOLUTION INTRAMUSCULAR; INTRAVENOUS at 05:19

## 2020-07-11 RX ADMIN — Medication 1000 MILLIGRAM(S): at 21:40

## 2020-07-11 RX ADMIN — Medication 1 APPLICATION(S): at 16:05

## 2020-07-11 RX ADMIN — MIDODRINE HYDROCHLORIDE 5 MILLIGRAM(S): 2.5 TABLET ORAL at 04:22

## 2020-07-11 RX ADMIN — Medication 85 MILLIMOLE(S): at 13:34

## 2020-07-11 RX ADMIN — Medication 1000 MILLIGRAM(S): at 01:01

## 2020-07-11 RX ADMIN — Medication 75 MEQ/KG/HR: at 00:17

## 2020-07-11 RX ADMIN — Medication 166.67 MILLIGRAM(S): at 15:27

## 2020-07-11 RX ADMIN — SODIUM CHLORIDE 100 MILLILITER(S): 9 INJECTION, SOLUTION INTRAVENOUS at 12:27

## 2020-07-11 NOTE — PATIENT PROFILE ADULT - DISASTER - NSPRESCRALCSIXMORE_GEN_A_NUR
[FreeTextEntry1] : 63 yo female with left shoulder mass. \par -Excision under local anesthesia. \par -All risk, benefit, alternatives explained and the patient expressed full understanding\par  Less than monthly

## 2020-07-11 NOTE — CONSULT NOTE ADULT - SUBJECTIVE AND OBJECTIVE BOX
[facility] Division of Kidney Diseases & Hypertension  INITIAL CONSULT NOTE  [phone]--------------------------------------------------------------------------------  HPI:    54 year old Female with PMHx of HTN, recent right hand 1st digit abscess s/p I&D admitted to [facility] on 7/10/20 for fever for 3 days. Nephrology team consulted for SUE and acidemia.    Upon revie of labs on [place], pt with previous SCr WNL. Last SCr: 0.74 on 6.29/20. On admission, pt with SCr: 2.48. Pt started on IVF with Scr improved to 1.49 today (7/11/20). Pt with recent right hand 1st digit abscess s/p I&D on 6/26-6/30. Pt discharged on Bactrim 800/160 to complete 14 day course. Pt also on Ramipril 5mg daily.     On admission (7/10/20), pt found to have metabolic acidosis with Serum bicarbonate 14 however repeat Serum HCO3: 7. Pt also noted to be hypotensive with BP: 81/47. Patient received IV NS 2L and started on Sodium bicarbonate drip 150meq in D5W at 150cc/hour. Repeat Serum bicarbonate 16 today (7/11/20). Pt also woth Scr: 1.49 today improved. On admssion, pt with SNa: 122 improved to 129 today.       PAST HISTORY  --------------------------------------------------------------------------------  PAST MEDICAL & SURGICAL HISTORY:  Arthritis  HTN (hypertension)  No pertinent past medical history  No significant past surgical history    FAMILY HISTORY:  No pertinent family history in first degree relatives    PAST SOCIAL HISTORY:    ALLERGIES & MEDICATIONS  --------------------------------------------------------------------------------  Allergies    No Known Allergies    Intolerances      Standing Inpatient Medications  cefepime   IVPB      cefepime   IVPB 1000 milliGRAM(s) IV Intermittent every 12 hours  sodium bicarbonate  Infusion 0.161 mEq/kG/Hr IV Continuous <Continuous>  vancomycin  IVPB 1250 milliGRAM(s) IV Intermittent once    PRN Inpatient Medications      REVIEW OF SYSTEMS  --------------------------------------------------------------------------------  Gen: No  fevers/chills, weakness  Skin: No rashes  Head/Eyes/Ears/Mouth: No headache; Normal hearing; Normal vision w/o blurriness;   Respiratory: No dyspnea, cough, wheezing, hemoptysis  CV: No chest pain,   GI: No abdominal pain, diarrhea, constipation, nausea, vomiting  : No increased frequency, dysuria, hematuria, nocturia  MSK: No joint pain/swelling;   Neuro: No dizziness/lightheadedness, weakness, seizures    All other systems were reviewed and are negative, except as noted.    VITALS/PHYSICAL EXAM  --------------------------------------------------------------------------------  T(C): 36.8 (07-11-20 @ 08:38), Max: 38.9 (07-11-20 @ 00:22)  HR: 96 (07-11-20 @ 08:38) (70 - 99)  BP: 90/63 (07-11-20 @ 08:38) (81/47 - 110/70)  RR: 18 (07-11-20 @ 08:38) (16 - 22)  SpO2: 100% (07-11-20 @ 08:38) (96% - 100%)  Wt(kg): --  Height (cm): 160 (07-10-20 @ 23:50)  Weight (kg): 70.2 (07-10-20 @ 23:50)  BMI (kg/m2): 27.4 (07-10-20 @ 23:50)  BSA (m2): 1.73 (07-10-20 @ 23:50)      07-10-20 @ 07:01  -  07-11-20 @ 07:00  --------------------------------------------------------  IN: 50 mL / OUT: 300 mL / NET: -250 mL      Physical Exam:  	Gen: NAD, well-appearing  	HEENT: PERRL, supple neck  	Pulm: CTA B/L  	CV:  S1S2  	Abd: +BS, soft   	Ext: No B/L Lower ext edema  	Neuro: No focal deficits  	Skin: Warm, without rashes  	Vascular access:     LABS/STUDIES  --------------------------------------------------------------------------------              10.1   2.58  >-----------<  75       [07-11-20 @ 05:00]              30.4     129  |  105  |  24  ----------------------------<  104      [07-11-20 @ 05:00]  4.4   |  16  |  1.49        Ca     7.7     [07-11-20 @ 05:00]      Mg     1.7     [07-11-20 @ 05:00]      Phos  2.8     [07-11-20 @ 05:00]    TPro  5.2  /  Alb  2.8  /  TBili  < 0.2  /  DBili  x   /  AST  460  /  ALT  342  /  AlkPhos  74  [07-11-20 @ 05:00]    PT/INR: PT 12.8 , INR 1.11       [07-11-20 @ 05:00]  PTT: 32.7       [07-11-20 @ 05:00]    Serum Osmolality 279      [07-10-20 @ 12:20]    Creatinine Trend:  SCr 1.49 [07-11 @ 05:00]  SCr 1.69 [07-10 @ 21:00]  SCr 1.83 [07-10 @ 14:50]  SCr 2.48 [07-10 @ 10:03]  SCr 0.74 [06-29 @ 07:30]    Urinalysis - [07-10-20 @ 13:22]      Color LIGHT YELLOW / Appearance Lt TURBID / SG 1.015 / pH 6.0      Gluc NEGATIVE / Ketone NEGATIVE  / Bili NEGATIVE / Urobili NORMAL       Blood NEGATIVE / Protein 50 / Leuk Est NEGATIVE / Nitrite NEGATIVE      RBC 3-5 / WBC 6-10 / Hyaline  / Gran  / Sq Epi FEW / Non Sq Epi  / Bacteria FEW    Urine Sodium 65      [07-10-20 @ 12:55]  Urine Potassium 15.7      [07-10-20 @ 12:55]  Urine Chloride 35      [07-10-20 @ 12:55]  Urine Osmolality 333      [07-10-20 @ 12:55] [facility] Division of Kidney Diseases & Hypertension  INITIAL CONSULT NOTE  [phone]--------------------------------------------------------------------------------  HPI:    54 year old Female with PMHx of HTN, recent right hand 1st digit abscess s/p I&D admitted to [facility] on 7/10/20 for fever for 3 days. Nephrology team consulted for SUE and acidemia.    Upon revie of labs on [place], pt with previous SCr WNL. Last SCr: 0.74 on 6.29/20. On admission, pt with SCr: 2.48 (7/10/20). Pt started on IVF with Scr improved to 1.49 today (7/11/20). Pt with recent right hand 1st digit abscess s/p I&D on 6/26-6/30. Pt discharged on Bactrim 800/160 to complete 14 day course. Pt also on Ramipril 5mg daily.     On admission (7/10/20), pt found to have metabolic acidosis with Serum bicarbonate 14 however repeat Serum HCO3: 7. Pt also noted to be hypotensive with BP: 81/47. Patient received IV NS 2L and started on Sodium bicarbonate drip 150meq in D5W at 75cc/hour. Repeat Serum bicarbonate 16 today (7/11/20). Pt also with Scr: 1.49 today improved. On admission pt with SNa: 122 improved to 129 today.     Patient seen and evaluated this morning. Pt reports having a poor appetite and feeling weak. Overnight patient was febrile with TMax: 101.1. Pt denies any headache, SOB, palpitations, cough, sore throat, diarrhea, constipation, neck pain/stiffness, dizziness, lightheadedness, or any other specific complaints.       PAST HISTORY  --------------------------------------------------------------------------------  PAST MEDICAL & SURGICAL HISTORY:  Arthritis  HTN (hypertension)  No pertinent past medical history  No significant past surgical history    FAMILY HISTORY:  No pertinent family history in first degree relatives    PAST SOCIAL HISTORY:  manager at [employer]. Nonsmoker, non-alcohol drinker    ALLERGIES & MEDICATIONS  --------------------------------------------------------------------------------  Allergies    No Known Allergies    Intolerances      Standing Inpatient Medications  cefepime   IVPB      cefepime   IVPB 1000 milliGRAM(s) IV Intermittent every 12 hours  sodium bicarbonate  Infusion 0.161 mEq/kG/Hr IV Continuous <Continuous>  vancomycin  IVPB 1250 milliGRAM(s) IV Intermittent once    PRN Inpatient Medications      REVIEW OF SYSTEMS  --------------------------------------------------------------------------------  Gen: + fever/chills, poor appetite  Skin: No rashes  Head/Eyes/Ears/Mouth: No headache; Normal hearing; Normal vision w/o blurriness;   Respiratory: No dyspnea, cough, wheezing, hemoptysis  CV: No chest pain,   GI: No abdominal pain, diarrhea, constipation, nausea, vomiting  : No increased frequency, dysuria, hematuria, nocturia  MSK: + right hand swelling,   Neuro: No dizziness/lightheadedness, weakness, seizures    All other systems were reviewed and are negative, except as noted.    VITALS/PHYSICAL EXAM  --------------------------------------------------------------------------------  T(C): 36.8 (07-11-20 @ 08:38), Max: 38.9 (07-11-20 @ 00:22)  HR: 96 (07-11-20 @ 08:38) (70 - 99)  BP: 90/63 (07-11-20 @ 08:38) (81/47 - 110/70)  RR: 18 (07-11-20 @ 08:38) (16 - 22)  SpO2: 100% (07-11-20 @ 08:38) (96% - 100%)  Wt(kg): --  Height (cm): 160 (07-10-20 @ 23:50)  Weight (kg): 70.2 (07-10-20 @ 23:50)  BMI (kg/m2): 27.4 (07-10-20 @ 23:50)  BSA (m2): 1.73 (07-10-20 @ 23:50)      07-10-20 @ 07:01  -  07-11-20 @ 07:00  --------------------------------------------------------  IN: 50 mL / OUT: 300 mL / NET: -250 mL      Physical Exam:  	Gen: NAD, well-appearing  	HEENT: PERRL, supple neck  	Pulm: CTA B/L  	CV:  S1S2  	Abd: +BS, soft   	Ext: No B/L Lower ext edema  	Neuro: No focal deficits  	Skin: Warm, + right hand with swelling, +tenderness, + erythema  	Vascular access:     LABS/STUDIES  --------------------------------------------------------------------------------              10.1   2.58  >-----------<  75       [07-11-20 @ 05:00]              30.4     129  |  105  |  24  ----------------------------<  104      [07-11-20 @ 05:00]  4.4   |  16  |  1.49        Ca     7.7     [07-11-20 @ 05:00]      Mg     1.7     [07-11-20 @ 05:00]      Phos  2.8     [07-11-20 @ 05:00]    TPro  5.2  /  Alb  2.8  /  TBili  < 0.2  /  DBili  x   /  AST  460  /  ALT  342  /  AlkPhos  74  [07-11-20 @ 05:00]    PT/INR: PT 12.8 , INR 1.11       [07-11-20 @ 05:00]  PTT: 32.7       [07-11-20 @ 05:00]    Serum Osmolality 279      [07-10-20 @ 12:20]    Creatinine Trend:  SCr 1.49 [07-11 @ 05:00]  SCr 1.69 [07-10 @ 21:00]  SCr 1.83 [07-10 @ 14:50]  SCr 2.48 [07-10 @ 10:03]  SCr 0.74 [06-29 @ 07:30]    Urinalysis - [07-10-20 @ 13:22]      Color LIGHT YELLOW / Appearance Lt TURBID / SG 1.015 / pH 6.0      Gluc NEGATIVE / Ketone NEGATIVE  / Bili NEGATIVE / Urobili NORMAL       Blood NEGATIVE / Protein 50 / Leuk Est NEGATIVE / Nitrite NEGATIVE      RBC 3-5 / WBC 6-10 / Hyaline  / Gran  / Sq Epi FEW / Non Sq Epi  / Bacteria FEW    Urine Sodium 65      [07-10-20 @ 12:55]  Urine Potassium 15.7      [07-10-20 @ 12:55]  Urine Chloride 35      [07-10-20 @ 12:55]  Urine Osmolality 333      [07-10-20 @ 12:55]

## 2020-07-11 NOTE — CONSULT NOTE ADULT - SUBJECTIVE AND OBJECTIVE BOX
Plastic Surgery Consult Note  ([phone])    HPI:  Pt is a 53 y/o F PMHx HTN, recent right hand 1st digit abscess s/p I&D p/w fevers x three days.  Pt states she has had fevers Tmax 103 for past three days.  Called my office with reports of fevers instructed to go to ER informed my office she wanted to go to urgent care. After calling her the next day found she did not go in reinforced importance of eval and stressed she should go to ER.   She notes she has been compliant with Bactrim.  Since yesterday, pt reports mild substernal and epigastric aching pain, nonradiating, nonpleuritic, nonexertional, nonpositional, which she attributes to nausea 2/2 Bactrim.  Pt notes burning dysuria.  Pt denies any headache, SOB, palpitations, cough, sore throat, diarrhea, constipation, neck pain/stiffness, dizziness, lightheadedness, or any other specific complaints. (10 Jul 2020 14:42)    Seeing patient in house as follow up    PAST MEDICAL & SURGICAL HISTORY:  Arthritis  HTN (hypertension)  No pertinent past medical history  No significant past surgical history      Allergies    No Known Allergies      Home Medications:  gabapentin 300 mg oral capsule: 1 cap(s) orally 3 times a day (10 Jul 2020 16:17)  meloxicam 15 mg oral tablet: 1 tab(s) orally once a day (10 Jul 2020 16:17)  ramipril 5 mg oral tablet:  (10 Jul 2020 16:17)      MEDICATIONS  (STANDING):  cefepime   IVPB      cefepime   IVPB 1000 milliGRAM(s) IV Intermittent every 12 hours  sodium bicarbonate  Infusion 0.161 mEq/kG/Hr (75 mL/Hr) IV Continuous <Continuous>  vancomycin  IVPB 1250 milliGRAM(s) IV Intermittent once      SOCIAL HISTORY:    FAMILY HISTORY:  No pertinent family history in first degree relatives      ___________________________________________  REVIEW OF SYSTEMS:    Constitutional: Fevers malaise  ENMT: No changes in hearing, no changes in vision, no sore throat, no cough  Respiratory: Dry throat  Cardiovascular: No chest pain, palpitations  Gastrointestinal: No abdominal pain, no diarrhea/constipation  Genitourinary: No dysuria, frequency, or urgency    Extremities: Right thumb swelling and wound  Neurological: No paresthesia  Skin: Wound right thumb    ___________________________________________  OBJECTIVE:  Vital Signs Last 24 Hrs  T(C): 36.8 (2020 08:38), Max: 38.9 (2020 00:22)  T(F): 98.3 (2020 08:38), Max: 102.1 (2020 00:22)  HR: 96 (2020 08:38) (70 - 99)  BP: 90/63 (2020 08:38) (81/47 - 110/70)  BP(mean): --  RR: 18 (2020 08:38) (16 - 22)  SpO2: 100% (2020 08:38) (96% - 100%)CAPILLARY BLOOD GLUCOSE        I&O's Detail    10 Jul 2020 07:01  -  2020 07:00  --------------------------------------------------------  IN:    Oral Fluid: 50 mL  Total IN: 50 mL    OUT:    Voided: 300 mL  Total OUT: 300 mL    Total NET: -250 mL          PHYSICAL EXAM:    General: Alert, NAD  Neuro: CN II-XII intact, motor and sensory grossly intact with no focal deficits.  HEENT: Normocephalic, atraumatic, EOMI  Respiratory: Breathing non-labored, airway patent  Extremities:  MSK: Right thumb swelling and eschar volar surface  ROM limited  ____________________________________________  LABS:  CBC Full  -  ( 2020 05:00 )  WBC Count : 2.58 K/uL  RBC Count : 3.48 M/uL  Hemoglobin : 10.1 g/dL  Hematocrit : 30.4 %  Platelet Count - Automated : 75 K/uL  Mean Cell Volume : 87.4 fL  Mean Cell Hemoglobin : 29.0 pg  Mean Cell Hemoglobin Concentration : 33.2 %  Auto Neutrophil # : 2.05 K/uL  Auto Lymphocyte # : 0.42 K/uL  Auto Monocyte # : 0.07 K/uL  Auto Eosinophil # : 0.03 K/uL  Auto Basophil # : 0.00 K/uL  Auto Neutrophil % : 79.4 %  Auto Lymphocyte % : 16.3 %  Auto Monocyte % : 2.7 %  Auto Eosinophil % : 1.2 %  Auto Basophil % : 0.0 %        129<L>  |  105  |  24<H>  ----------------------------<  104<H>  4.4   |  16<L>  |  1.49<H>    Ca    7.7<L>      2020 05:00  Phos  2.8       Mg     1.7         TPro  5.2<L>  /  Alb  2.8<L>  /  TBili  < 0.2<L>  /  DBili  x   /  AST  460<H>  /  ALT  342<H>  /  AlkPhos  74  11    LIVER FUNCTIONS - ( 2020 05:00 )  Alb: 2.8 g/dL / Pro: 5.2 g/dL / ALK PHOS: 74 u/L / ALT: 342 u/L / AST: 460 u/L / GGT: x           PT/INR - ( 2020 05:00 )   PT: 12.8 SEC;   INR: 1.11          PTT - ( 2020 05:00 )  PTT:32.7 SEC  Urinalysis Basic - ( 10 Jul 2020 13:22 )    Color: LIGHT YELLOW / Appearance: Lt TURBID / S.015 / pH: 6.0  Gluc: NEGATIVE / Ketone: NEGATIVE  / Bili: NEGATIVE / Urobili: NORMAL   Blood: NEGATIVE / Protein: 50 / Nitrite: NEGATIVE   Leuk Esterase: NEGATIVE / RBC: 3-5 / WBC 6-10   Sq Epi: FEW / Non Sq Epi: x / Bacteria: FEW            ____________________________________________  MICRO:    Culture - Urine (collected 10 Jul 2020 13:55)  Source: .Urine Clean Catch (Midstream)  Final Report (2020 09:05):    <10,000 CFU/mL Normal Urogenital Anjali      IMP: ARF with fevers, history of MRSA right thumb infection  -Right thumb has improved significantly less swollen and no drainage  Has eschar unchanged    Rec Silvadene daily to open wounds thumb and dry dressing   OK to wash w soap and water between changes.

## 2020-07-11 NOTE — PROGRESS NOTE ADULT - PROBLEM SELECTOR PLAN 1
P/w hypotension, bicarb 14, VBG showing pH of 7.22  - previous admission with right hand cellulitis, wound cultures grew MRSA and was treated with vanc/zosyn with last dose on 6/30, sent home on 14 day course of DS 160mg/800mg TMP/SMX PO bid, MRI of the hand on 6/30 showed mild early sign of osteomyelitis.   metabolic acidosis due to renal failure could be due to bactrim  - In ED pt got 2.1 L of NS and 1g vanc and zosyn  - recheck BMP and VBG after fluid resuscitation   - Start bicarb drip 150meq/L at 75cc/hr  - Plan as below for infection   - plan as below for SUE P/w hypotension, bicarb 14, VBG showing pH of 7.22  - previous admission with right hand cellulitis, wound cultures grew MRSA and was treated with vanc/zosyn with last dose on 6/30, sent home on 14 day course of DS 160mg/800mg TMP/SMX PO bid, MRI of the hand on 6/30 showed mild early sign of osteomyelitis.   metabolic acidosis due to renal failure could be due to bactrim  - In ED pt got 2.1 L of NS and 1g vanc and zosyn  - recheck BMP and VBG after fluid resuscitation   - Start bicarb drip 150meq/L at 75cc/hr --> improved, appreciate renal eval, transition to LR, f/u  - Plan as below for infection   - plan as below for SUE

## 2020-07-11 NOTE — PROGRESS NOTE ADULT - PROBLEM SELECTOR PLAN 4
,  likely due to hypotension less likely due to obstructive process  - no need for liver imaging at this time   - cont to monitor with CMP q8h ,  likely due to hypotension less likely due to obstructive process  - continues to increase ??sepsis, ??drug reaction/toxicity - check US, f/u hepatology

## 2020-07-11 NOTE — PROGRESS NOTE ADULT - ASSESSMENT
53 y/o F w/ PMH HTN and arthritis who presented two weeks ago with sepsis 2/2 purulent cellulitis sent home on bactrim who now presents with multiorgan dysfunction, metabolic acidosis, and hypotension most likely due to purulent cellulitis vs Bactrim toxicity. 55 y/o F w/ PMH HTN and arthritis who presented two weeks ago with sepsis 2/2 purulent cellulitis sent home on bactrim who now presents with multiorgan dysfunction, metabolic acidosis, and hypotension most likely due to sepsis ?from purulent cellulitis vs Bactrim toxicity.

## 2020-07-11 NOTE — PROGRESS NOTE ADULT - SUBJECTIVE AND OBJECTIVE BOX
ALEXANDRA BAKER  54y  Female      Subjective:     Overnight, patient was noted to be hypotensive and febrile. Was given 2 grams of tylenol over the course of the day and temperature was brought down by ice packs. In addition, patient was also given a 1 L bolus of NS as well as 5 mg of midodrine po x1.    Meds    MEDICATIONS  (STANDING):  cefepime   IVPB      cefepime   IVPB 1000 milliGRAM(s) IV Intermittent every 12 hours  lactated ringers. 500 milliLiter(s) (1000 mL/Hr) IV Continuous <Continuous>  sodium bicarbonate  Infusion 0.161 mEq/kG/Hr (75 mL/Hr) IV Continuous <Continuous>    MEDICATIONS  (PRN):        Vital Signs Last 24 Hrs  T(C): 37.5 (11 Jul 2020 07:23), Max: 38.9 (11 Jul 2020 00:22)  T(F): 99.5 (11 Jul 2020 07:23), Max: 102.1 (11 Jul 2020 00:22)  HR: 87 (11 Jul 2020 05:19) (70 - 99)  BP: 105/68 (11 Jul 2020 05:19) (79/58 - 110/70)  BP(mean): --  RR: 18 (11 Jul 2020 05:19) (16 - 22)  SpO2: 99% (11 Jul 2020 05:19) (96% - 100%)    PHYSICAL EXAM:  GENERAL: NAD, well-groomed, well-developed  HEENT - NC/AT, pupils equal and reactive to light,  ; Moist mucous membranes, Good dentition, No lesions  NECK: Supple, No JVD  CHEST/LUNG: Clear to auscultation bilaterally; No rales, rhonchi, wheezing  HEART: Regular rate and rhythm; No murmurs, rubs, or gallops  ABDOMEN: Soft, Nontender, Nondistended; Bowel sounds present  EXTREMITIES:  2+ Peripheral Pulses, No clubbing, cyanosis, or edema  NEURO:  No Focal deficits, sensory and motor intact  SKIN: No rashes or lesions    Consultant(s) Notes Reviewed:  [x ] YES  [ ] NO  Care Discussed with Consultants/Other Providers [ x] YES  [ ] NO    LABS:          RADIOLOGY & ADDITIONAL TESTS: ALEXANDRA BAKER  54y  Female      Subjective:     Overnight, patient was noted to be hypotensive and febrile. Was given 2 grams of tylenol over the course of the day and temperature was brought down by ice packs. In addition, patient was also given a 1 L bolus of NS as well as 5 mg of midodrine po x1.    This am, patient states she has been feeling well. Denies fevers, chills, nausea, vomiting, diarrhea, chest pain.    Meds    MEDICATIONS  (STANDING):  cefepime   IVPB      cefepime   IVPB 1000 milliGRAM(s) IV Intermittent every 12 hours  lactated ringers. 500 milliLiter(s) (1000 mL/Hr) IV Continuous <Continuous>  sodium bicarbonate  Infusion 0.161 mEq/kG/Hr (75 mL/Hr) IV Continuous <Continuous>    MEDICATIONS  (PRN):        Vital Signs Last 24 Hrs  T(C): 37.5 (2020 07:23), Max: 38.9 (2020 00:22)  T(F): 99.5 (2020 07:23), Max: 102.1 (2020 00:22)  HR: 87 (2020 05:19) (70 - 99)  BP: 105/68 (2020 05:19) (79/58 - 110/70)  BP(mean): --  RR: 18 (2020 05:19) (16 - 22)  SpO2: 99% (2020 05:19) (96% - 100%)    PHYSICAL EXAM:  GENERAL: NAD, well-groomed, well-developed  HEENT - NC/AT, pupils equal and reactive to light  NECK: Supple, No JVD  CHEST/LUNG: Clear to auscultation bilaterally; No rales, rhonchi, wheezing  HEART: Regular rate and rhythm; No murmurs, rubs, or gallops  ABDOMEN: distended abdomen  EXTREMITIES:  2+ Peripheral Pulses, No clubbing, cyanosis, or edema  NEURO:  No Focal deficits, sensory and motor intact  SKIN: No rashes or lesions    Consultant(s) Notes Reviewed:  [x ] YES  [ ] NO  Care Discussed with Consultants/Other Providers [ x] YES  [ ] NO    LABS:      The Labs were reviewed by me   The Radiology was reviewed by me    EKG tracing reviewed by me        129<L>  |  105  |  24<H>  ----------------------------<  104<H>  4.4   |  16<L>  |  1.49<H>  07-10    127<L>  |  101  |  28<H>  ----------------------------<  94  4.7   |  12<L>  |  1.69<H>  07-10    123<L>  |  102  |  36<H>  ----------------------------<  161<H>  4.7   |  7<LL>  |  1.83<H>    Ca    7.7<L>      2020 05:00  Ca    7.8<L>      10 Jul 2020 21:00  Ca    7.8<L>      10 Jul 2020 14:50  Phos  2.8       Mg     1.7         TPro  5.2<L>  /  Alb  2.8<L>  /  TBili  < 0.2<L>  /  DBili  x   /  AST  460<H>  /  ALT  342<H>  /  AlkPhos  74    TPro  6.8  /  Alb  3.3  /  TBili  < 0.2<L>  /  DBili  x   /  AST  404<H>  /  ALT  350<H>  /  AlkPhos  79  07-10  TPro  6.5  /  Alb  2.6<L>  /  TBili  < 0.2<L>  /  DBili  < 0.2  /  AST  305<H>  /  ALT  260<H>  /  AlkPhos  64  07-10    Magnesium, Serum: 1.7 mg/dL (20 @ 05:00)  Magnesium, Serum: 1.8 mg/dL (07-10-20 @ 21:00)  Magnesium, Serum: 2.0 mg/dL (07-10-20 @ 09:35)    Phosphorus Level, Serum: 2.8 mg/dL (20 @ 05:00)  Phosphorus Level, Serum: 2.9 mg/dL (07-10-20 @ 21:00)      PT/INR - ( 2020 05:00 )   PT: 12.8 SEC;   INR: 1.11          PTT - ( 2020 05:00 )  PTT:32.7 SEC              Urinalysis Basic - ( 10 Jul 2020 13:22 )    Color: LIGHT YELLOW / Appearance: Lt TURBID / S.015 / pH: 6.0  Gluc: NEGATIVE / Ketone: NEGATIVE  / Bili: NEGATIVE / Urobili: NORMAL   Blood: NEGATIVE / Protein: 50 / Nitrite: NEGATIVE   Leuk Esterase: NEGATIVE / RBC: 3-5 / WBC 6-10   Sq Epi: FEW / Non Sq Epi: x / Bacteria: FEW                              10.1   2.58  )-----------( 75       ( 2020 05:00 )             30.4                         11.4   5.18  )-----------( 80       ( 10 Jul 2020 10:03 )             34.3     CAPILLARY BLOOD GLUCOSE              RADIOLOGY & ADDITIONAL TESTS: ********************************INCOMPLETE NOTE************************************************  ALEXANDRA BAKER  54y  Female      Subjective:     Overnight, patient was noted to be hypotensive and febrile. Was given 2 grams of tylenol over the course of the day and temperature was brought down by ice packs. In addition, patient was also given a 1 L bolus of NS as well as 5 mg of midodrine po x1.    This am, patient states she has been feeling well. Denies fevers, chills, nausea, vomiting, diarrhea, chest pain.    Meds    MEDICATIONS  (STANDING):  cefepime   IVPB      cefepime   IVPB 1000 milliGRAM(s) IV Intermittent every 12 hours  lactated ringers. 500 milliLiter(s) (1000 mL/Hr) IV Continuous <Continuous>  sodium bicarbonate  Infusion 0.161 mEq/kG/Hr (75 mL/Hr) IV Continuous <Continuous>    MEDICATIONS  (PRN):        Vital Signs Last 24 Hrs  T(C): 37.5 (2020 07:23), Max: 38.9 (2020 00:22)  T(F): 99.5 (2020 07:23), Max: 102.1 (2020 00:22)  HR: 87 (2020 05:19) (70 - 99)  BP: 105/68 (2020 05:19) (79/58 - 110/70)  BP(mean): --  RR: 18 (2020 05:19) (16 - 22)  SpO2: 99% (2020 05:19) (96% - 100%)    PHYSICAL EXAM:  GENERAL: NAD, well-groomed, well-developed  HEENT - NC/AT, pupils equal and reactive to light  NECK: Supple, No JVD  CHEST/LUNG: Clear to auscultation bilaterally; No rales, rhonchi, wheezing  HEART: Regular rate and rhythm; No murmurs, rubs, or gallops  ABDOMEN: distended abdomen  EXTREMITIES:  2+ Peripheral Pulses, No clubbing, cyanosis, or edema  NEURO:  No Focal deficits, sensory and motor intact  SKIN: No rashes or lesions    Consultant(s) Notes Reviewed:  [x ] YES  [ ] NO  Care Discussed with Consultants/Other Providers [ x] YES  [ ] NO    LABS:      The Labs were reviewed by me   The Radiology was reviewed by me    EKG tracing reviewed by me        129<L>  |  105  |  24<H>  ----------------------------<  104<H>  4.4   |  16<L>  |  1.49<H>  07-10    127<L>  |  101  |  28<H>  ----------------------------<  94  4.7   |  12<L>  |  1.69<H>  07-10    123<L>  |  102  |  36<H>  ----------------------------<  161<H>  4.7   |  7<LL>  |  1.83<H>    Ca    7.7<L>      2020 05:00  Ca    7.8<L>      10 Jul 2020 21:00  Ca    7.8<L>      10 Jul 2020 14:50  Phos  2.8       Mg     1.7         TPro  5.2<L>  /  Alb  2.8<L>  /  TBili  < 0.2<L>  /  DBili  x   /  AST  460<H>  /  ALT  342<H>  /  AlkPhos  74    TPro  6.8  /  Alb  3.3  /  TBili  < 0.2<L>  /  DBili  x   /  AST  404<H>  /  ALT  350<H>  /  AlkPhos  79  07-10  TPro  6.5  /  Alb  2.6<L>  /  TBili  < 0.2<L>  /  DBili  < 0.2  /  AST  305<H>  /  ALT  260<H>  /  AlkPhos  64  07-10    Magnesium, Serum: 1.7 mg/dL (20 @ 05:00)  Magnesium, Serum: 1.8 mg/dL (07-10-20 @ 21:00)  Magnesium, Serum: 2.0 mg/dL (07-10-20 @ 09:35)    Phosphorus Level, Serum: 2.8 mg/dL (20 @ 05:00)  Phosphorus Level, Serum: 2.9 mg/dL (07-10-20 @ 21:00)      PT/INR - ( 2020 05:00 )   PT: 12.8 SEC;   INR: 1.11          PTT - ( 2020 05:00 )  PTT:32.7 SEC              Urinalysis Basic - ( 10 Jul 2020 13:22 )    Color: LIGHT YELLOW / Appearance: Lt TURBID / S.015 / pH: 6.0  Gluc: NEGATIVE / Ketone: NEGATIVE  / Bili: NEGATIVE / Urobili: NORMAL   Blood: NEGATIVE / Protein: 50 / Nitrite: NEGATIVE   Leuk Esterase: NEGATIVE / RBC: 3-5 / WBC 6-10   Sq Epi: FEW / Non Sq Epi: x / Bacteria: FEW                              10.1   2.58  )-----------( 75       ( 2020 05:00 )             30.4                         11.4   5.18  )-----------( 80       ( 10 Jul 2020 10:03 )             34.3     CAPILLARY BLOOD GLUCOSE              RADIOLOGY & ADDITIONAL TESTS:

## 2020-07-11 NOTE — PROVIDER CONTACT NOTE (OTHER) - ACTION/TREATMENT ORDERED:
Gualberto Zarate, [id number] notified
Intern to speak with Team 4 & arrange US guided IV access & labs drawn.
Pending Night Float Admit assessment and plan of care.
continue to monitor, call bell left within reach, leave wound open to air until plastics comes in AM to assess. will continue to monitor absess and hand
continue to monitor, call bell left within reach, night float will come assess pt.
continue to monitor, call bell left within reach, place ice packs all over pt body to help lower temp
continue to monitor, call dickey left within reach
continue to monitor. give PO tylenol, recheck temp in 1 hour

## 2020-07-11 NOTE — PROGRESS NOTE ADULT - PROBLEM SELECTOR PLAN 5
Plts 80, her baseline is 250's, possible DITP 2/2 bacrtim use, pt did not receive heparin on last admission, therefore no suspicion for HIT  - hold heparin  - continue to monitor with CBC Plts 80, her baseline is 250's, possible DITP 2/2 bactrim use, pt did not receive heparin on last admission, therefore no suspicion for HIT  - hold heparin  - continue to monitor with CBC

## 2020-07-11 NOTE — PROVIDER CONTACT NOTE (OTHER) - RECOMMENDATIONS
continue to monitor, call bell left within reach, place ice packs all over pt body to help lower temp

## 2020-07-11 NOTE — PROGRESS NOTE ADULT - PROBLEM SELECTOR PLAN 2
BP's 80s/50s in setting of metabolic acidosis, unclear if this is related to infxn vs drug reaction.   - pt getting 2.1 L of NS in ED   - f/u blood cx, urine cx, UA negative  - cont with vancomycin by level and cefepime BP's 80s/50s in setting of metabolic acidosis, unclear if this is related to infxn vs drug reaction.   - responding well to IVFs, f/u closely  - f/u blood cx, urine cx, UA negative; abx narrowed to vanco per ID

## 2020-07-11 NOTE — CONSULT NOTE ADULT - SUBJECTIVE AND OBJECTIVE BOX
Chief Complaint:  fever    HPI:   53 y/o F PMHx HTN, recent right hand 1st digit abscess s/p I&D p/w fevers x three days.    Pt states she has had fevers Tmax 103 for past three days.  She spoke with Dr. Connor on the phone yesterday who urged her to go to urgent care yesterday.  She again called Dr. Connor today who directed pt to ED this morning.    She reports she had dressing changed by Dr. Connor 4 days ago.  She notes she has been compliant with Bactrim.    Since yesterday, pt reports mild substernal and epigastric aching pain, non radiating, nonpleuritic, nonexertional, non positional, which she attributes to nausea 2/2 Bactrim.    Pt notes burning dysuria.    Pt denies any headache, SOB, palpitations, cough, sore throat, diarrhea, constipation, neck pain/stiffness, dizziness, lightheadedness, or any other specific complaints.    Allergies:  No Known Allergies    Home Medications:  gabapentin 300 mg oral capsule: 1 cap(s) orally 3 times a day (10 Jul 2020 16:17)  meloxicam 15 mg oral tablet: 1 tab(s) orally once a day (10 Jul 2020 16:17)  ramipril 5 mg oral tablet:  (10 Jul 2020 16:17)      Hospital Medications:  lactated ringers. 1000 milliLiter(s) IV Continuous <Continuous>  silver sulfADIAZINE 1% Cream 1 Application(s) Topical two times a day      PMHX/PSHX:    Arthritis  HTN (hypertension)  s/p I&D      Family history:  No family history of liver disease in first degree relatives      Social History: no smoking    ROS:   General:  No fevers, chills or night sweats  ENT:  No sore throat or dysphagia  CV:  No pain or palpitations  Resp:  No dyspnea, cough or  wheezing  GI:  as above  Skin:  No rash or edema  Neuro: no weakness   Hematologic: no bleeding  Musculoskeletal: no muscle pain or join pain  Psych: no agitation     : no dysuria      PHYSICAL EXAM:   GENERAL:  NAD, Appears stated age  HEENT:  NC/AT,  conjunctivae clear and pink, sclera -anicteric  CHEST:  CTA B/L, Normal effort  HEART:  RRR S1/S2, No murmurs  ABDOMEN:  Soft, non-tender, non-distended, normoactive bowel sounds,  no masses   EXTREMITIES  No cyanosis or Edema  SKIN: Right thumb swelling and eschar volar surface  NEURO:  Alert, oriented    Vital Signs:  Vital Signs Last 24 Hrs  T(C): 37.9 (2020 16:40), Max: 39.1 (2020 12:27)  T(F): 100.2 (2020 16:40), Max: 102.3 (2020 12:27)  HR: 85 (2020 16:40) (80 - 99)  BP: 112/59 (2020 16:40) (81/47 - 119/69)  BP(mean): --  RR: 18 (2020 16:40) (16 - 19)  SpO2: 100% (2020 16:40) (96% - 100%)  Daily Height in cm: 160.02 (10 Jul 2020 23:50)    Daily     LABS:                        10.1   2.58  )-----------( 75       ( 2020 05:00 )             30.4     Mean Cell Volume: 87.4 fL (20 @ 05:00)    07-11    127<L>  |  101  |  19  ----------------------------<  123<H>  4.3   |  16<L>  |  1.17    Ca    7.4<L>      2020 11:36  Phos  1.5     0711  Mg     1.6     11    TPro  5.3<L>  /  Alb  2.8<L>  /  TBili  < 0.2<L>  /  DBili  x   /  AST  502<H>  /  ALT  372<H>  /  AlkPhos  95  07-11    LIVER FUNCTIONS - ( 2020 11:36 )  Alb: 2.8 g/dL / Pro: 5.3 g/dL / ALK PHOS: 95 u/L / ALT: 372 u/L / AST: 502 u/L / GGT: x           PT/INR - ( 2020 05:00 )   PT: 12.8 SEC;   INR: 1.11          PTT - ( 2020 05:00 )  PTT:32.7 SEC  Urinalysis Basic - ( 10 Jul 2020 13:22 )    Color: LIGHT YELLOW / Appearance: Lt TURBID / S.015 / pH: 6.0  Gluc: NEGATIVE / Ketone: NEGATIVE  / Bili: NEGATIVE / Urobili: NORMAL   Blood: NEGATIVE / Protein: 50 / Nitrite: NEGATIVE   Leuk Esterase: NEGATIVE / RBC: 3-5 / WBC 6-10   Sq Epi: FEW / Non Sq Epi: x / Bacteria: FEW                              10.1   2.58  )-----------( 75       ( 2020 05:00 )             30.4                         11.4   5.18  )-----------( 80       ( 10 Jul 2020 10:03 )             34.3     Imaging:    < from: Xray Finger, Right Hand (07.10.20 @ 11:43) >  EXAM:  RAD FINGER(S) RIGHT HAND        PROCEDURE DATE:  Jul 10 2020         INTERPRETATION:  TIME OF EXAM: July 10, 2020 at 11:48 AM    CLINICAL INFORMATION: First digit swelling and erythema.    TECHNIQUE:   PA, oblique and lateral views of the right thumb.    INTERPRETATION:     There is no acute fracture or dislocation. No subcutaneous air or bone destruction. Generalized swelling of the first digit.      COMPARISON:  None available      IMPRESSION:  No acute fracture or dislocation.    < end of copied text > Chief Complaint:  fever    HPI:   53 y/o F PMHx HTN, recent right hand 1st digit abscess s/p I&D p/w fevers x three days.    Pt states she has had fevers Tmax 103 for past three days.  She spoke with Dr. Connor on the phone yesterday who urged her to go to urgent care yesterday.  She again called Dr. Connor today who directed pt to ED this morning.    She reports she had dressing changed by Dr. Connor 4 days ago.  She notes she has been compliant with Bactrim.    Since yesterday, pt reports mild substernal and epigastric aching pain, non radiating, nonpleuritic, nonexertional, non positional, which she attributes to nausea 2/2 Bactrim.    Pt notes burning dysuria.    Pt denies any headache, SOB, palpitations, cough, sore throat, diarrhea, constipation, neck pain/stiffness, dizziness, lightheadedness, or any other specific complaints.    Allergies:  No Known Allergies    Home Medications:  gabapentin 300 mg oral capsule: 1 cap(s) orally 3 times a day (10 Jul 2020 16:17)  meloxicam 15 mg oral tablet: 1 tab(s) orally once a day (10 Jul 2020 16:17)  ramipril 5 mg oral tablet:  (10 Jul 2020 16:17)      Hospital Medications:  lactated ringers. 1000 milliLiter(s) IV Continuous <Continuous>  silver sulfADIAZINE 1% Cream 1 Application(s) Topical two times a day      PMHX/PSHX:    Arthritis  HTN (hypertension)  s/p I&D      Family history:  No family history of liver disease in first degree relatives      Social History: no smoking    ROS:   General:  No fevers, chills or night sweats  ENT:  No sore throat or dysphagia  CV:  No pain or palpitations  Resp:  No dyspnea, cough or  wheezing  GI:  as above  Skin:  + rash upper chest   Neuro: no weakness   Hematologic: no bleeding  Musculoskeletal: no muscle pain or join pain  Psych: no agitation     : no dysuria      PHYSICAL EXAM:   GENERAL:  NAD, Appears stated age  HEENT:  NC/AT,  conjunctivae clear and pink, sclera -anicteric  CHEST:  CTA B/L, Normal effort  HEART:  RRR S1/S2, No murmurs  ABDOMEN:  Soft, non-tender, non-distended, normoactive bowel sounds,  no masses   EXTREMITIES  No cyanosis or Edema  SKIN: Right thumb swelling and eschar volar surface. Rash upper chest  NEURO:  Alert, oriented    Vital Signs:  Vital Signs Last 24 Hrs  T(C): 37.9 (2020 16:40), Max: 39.1 (2020 12:27)  T(F): 100.2 (2020 16:40), Max: 102.3 (2020 12:27)  HR: 85 (2020 16:40) (80 - 99)  BP: 112/59 (2020 16:40) (81/47 - 119/69)  BP(mean): --  RR: 18 (2020 16:40) (16 - 19)  SpO2: 100% (2020 16:40) (96% - 100%)  Daily Height in cm: 160.02 (10 Jul 2020 23:50)    Daily     LABS:                        10.1   2.58  )-----------( 75       ( 2020 05:00 )             30.4     Mean Cell Volume: 87.4 fL (20 @ 05:00)    07-11    127<L>  |  101  |  19  ----------------------------<  123<H>  4.3   |  16<L>  |  1.17    Ca    7.4<L>      2020 11:36  Phos  1.5     0711  Mg     1.6     11    TPro  5.3<L>  /  Alb  2.8<L>  /  TBili  < 0.2<L>  /  DBili  x   /  AST  502<H>  /  ALT  372<H>  /  AlkPhos  95  07-11    LIVER FUNCTIONS - ( 2020 11:36 )  Alb: 2.8 g/dL / Pro: 5.3 g/dL / ALK PHOS: 95 u/L / ALT: 372 u/L / AST: 502 u/L / GGT: x           PT/INR - ( 2020 05:00 )   PT: 12.8 SEC;   INR: 1.11          PTT - ( 2020 05:00 )  PTT:32.7 SEC  Urinalysis Basic - ( 10 Jul 2020 13:22 )    Color: LIGHT YELLOW / Appearance: Lt TURBID / S.015 / pH: 6.0  Gluc: NEGATIVE / Ketone: NEGATIVE  / Bili: NEGATIVE / Urobili: NORMAL   Blood: NEGATIVE / Protein: 50 / Nitrite: NEGATIVE   Leuk Esterase: NEGATIVE / RBC: 3-5 / WBC 6-10   Sq Epi: FEW / Non Sq Epi: x / Bacteria: FEW                              10.1   2.58  )-----------( 75       ( 2020 05:00 )             30.4                         11.4   5.18  )-----------( 80       ( 10 Jul 2020 10:03 )             34.3     Imaging:    < from: Xray Finger, Right Hand (07.10.20 @ 11:43) >  EXAM:  RAD FINGER(S) RIGHT HAND        PROCEDURE DATE:  Jul 10 2020         INTERPRETATION:  TIME OF EXAM: July 10, 2020 at 11:48 AM    CLINICAL INFORMATION: First digit swelling and erythema.    TECHNIQUE:   PA, oblique and lateral views of the right thumb.    INTERPRETATION:     There is no acute fracture or dislocation. No subcutaneous air or bone destruction. Generalized swelling of the first digit.      COMPARISON:  None available      IMPRESSION:  No acute fracture or dislocation.    < end of copied text > Chief Complaint:  fever    HPI:   53 y/o F PMHx HTN, recent right hand 1st digit abscess s/p I&D p/w fevers x three days.    Patient was recently hospitalized - for sepsis 2/2 right hand 1st digit abscess s/p I&D on 20  She was discharged home on Bactrim on . ( Wound culture grew MRSA)  About 7-9 days later she started having fevers, itching, rash on her upper chest and abdominal discomfort.  She called Dr. Connor ( her plastic surgeon)  today who directed pt to ED this morning.    Pt denies any headache, SOB, palpitations, cough, sore throat, diarrhea, constipation, neck pain/stiffness, dizziness, lightheadedness, or any other specific complaints.    Allergies:  No Known Allergies    Home Medications:  gabapentin 300 mg oral capsule: 1 cap(s) orally 3 times a day (10 Jul 2020 16:17)  meloxicam 15 mg oral tablet: 1 tab(s) orally once a day (10 Jul 2020 16:17)  ramipril 5 mg oral tablet:  (10 Jul 2020 16:17)      Hospital Medications:  lactated ringers. 1000 milliLiter(s) IV Continuous <Continuous>  silver sulfADIAZINE 1% Cream 1 Application(s) Topical two times a day      PMHX/PSHX:    Arthritis  HTN (hypertension)  s/p I&D      Family history:  No family history of liver disease in first degree relatives      Social History: no smoking    ROS:   General:  No fevers, chills or night sweats  ENT:  No sore throat or dysphagia  CV:  No pain or palpitations  Resp:  No dyspnea, cough or  wheezing  GI:  as above  Skin:  + rash upper chest   Neuro: no weakness   Hematologic: no bleeding  Musculoskeletal: no muscle pain or join pain  Psych: no agitation     : no dysuria      PHYSICAL EXAM:   GENERAL:  NAD, Appears stated age  HEENT:  NC/AT,  conjunctivae clear and pink, sclera -anicteric  CHEST:  CTA B/L, Normal effort  HEART:  RRR S1/S2, No murmurs  ABDOMEN:  Soft, non-tender, non-distended, normoactive bowel sounds,  no masses   EXTREMITIES  No cyanosis or Edema  SKIN: Right thumb swelling and eschar volar surface. Rash upper chest  NEURO:  Alert, oriented    Vital Signs:  Vital Signs Last 24 Hrs  T(C): 37.9 (2020 16:40), Max: 39.1 (2020 12:27)  T(F): 100.2 (2020 16:40), Max: 102.3 (2020 12:27)  HR: 85 (2020 16:40) (80 - 99)  BP: 112/59 (2020 16:40) (81/47 - 119/69)  BP(mean): --  RR: 18 (2020 16:40) (16 - 19)  SpO2: 100% (2020 16:40) (96% - 100%)  Daily Height in cm: 160.02 (10 Jul 2020 23:50)    Daily     LABS:                        10.1   2.58  )-----------( 75       ( 2020 05:00 )             30.4     Mean Cell Volume: 87.4 fL (20 @ 05:00)    07-11    127<L>  |  101  |  19  ----------------------------<  123<H>  4.3   |  16<L>  |  1.17    Ca    7.4<L>      2020 11:36  Phos  1.5     07-11  Mg     1.6     07-11    TPro  5.3<L>  /  Alb  2.8<L>  /  TBili  < 0.2<L>  /  DBili  x   /  AST  502<H>  /  ALT  372<H>  /  AlkPhos  95  07-11    LIVER FUNCTIONS - ( 2020 11:36 )  Alb: 2.8 g/dL / Pro: 5.3 g/dL / ALK PHOS: 95 u/L / ALT: 372 u/L / AST: 502 u/L / GGT: x           PT/INR - ( 2020 05:00 )   PT: 12.8 SEC;   INR: 1.11          PTT - ( 2020 05:00 )  PTT:32.7 SEC  Urinalysis Basic - ( 10 Jul 2020 13:22 )    Color: LIGHT YELLOW / Appearance: Lt TURBID / S.015 / pH: 6.0  Gluc: NEGATIVE / Ketone: NEGATIVE  / Bili: NEGATIVE / Urobili: NORMAL   Blood: NEGATIVE / Protein: 50 / Nitrite: NEGATIVE   Leuk Esterase: NEGATIVE / RBC: 3-5 / WBC 6-10   Sq Epi: FEW / Non Sq Epi: x / Bacteria: FEW                              10.1   2.58  )-----------( 75       ( 2020 05:00 )             30.4                         11.4   5.18  )-----------( 80       ( 10 Jul 2020 10:03 )             34.3     Imaging:    < from: Xray Finger, Right Hand (07.10.20 @ 11:43) >  EXAM:  RAD FINGER(S) RIGHT HAND        PROCEDURE DATE:  Jul 10 2020         INTERPRETATION:  TIME OF EXAM: July 10, 2020 at 11:48 AM    CLINICAL INFORMATION: First digit swelling and erythema.    TECHNIQUE:   PA, oblique and lateral views of the right thumb.    INTERPRETATION:     There is no acute fracture or dislocation. No subcutaneous air or bone destruction. Generalized swelling of the first digit.      COMPARISON:  None available      IMPRESSION:  No acute fracture or dislocation.    < end of copied text >

## 2020-07-11 NOTE — CONSULT NOTE ADULT - ASSESSMENT
53 y/o F PMHx HTN, recent right hand 1st digit abscess s/p I&D p/w fevers x three day, severe sepsis, SUE and metabolic acidosis. She was finishing a course of Bactrim for MRSA cellulitis.  Wound still with erythema and some mild swelling, likely ongoing course of initial infection.    Hepatology consulted for elevated liver enzymes.  Patient with normal liver enzymes prior to this admission ( 06/2020)   On admission (7/10/20), pt found to have elevated AST up to 500 today and 370s of ALT. Platelet: 80k,    Otherwise normal INR, ALP and T. Bili     impression:  # Elevated liver enzymes likely due to DILI from Bactrim. DDx: sepsis, viral infection, AIH, cholecystitis   # Pancytopenia, fever, SUE likely represent adverse reactions to bactrim   # Right hand 1st digit abscess s/p I&D     Recommendations:  - Daily CMP and INR  - Agree with ID to discontinue bactrim.  - obtain abdominal US to evaluate GB, liver and biliary tree  - obtain HBsAb, HBsAg, HBcAb, HCV Ab, HAV IgG/IgM to r/o viral hepatitides   - obtain RICHIE, ASMA, LKM Ab, quant IgM, IgA, IgG, AMA to r/o autoimmune disease  - Supportive care as per primary team      Lisette Payne   Gastroenterology Fellow  Pager: [phone]  Please page GI (Pager: [phone]) or if there are any additional questions or concerns.   Please call on-call GI fellow after 5pm and before 8am, and on weekends. 53 y/o F PMHx HTN, recent right hand 1st digit abscess s/p I&D p/w fevers x three day, severe sepsis, SUE and metabolic acidosis. She was finishing a course of Bactrim for MRSA cellulitis.  Wound still with erythema and some mild swelling, likely ongoing course of initial infection.    Hepatology consulted for elevated liver enzymes.  Patient with normal liver enzymes prior to this admission ( 06/2020)   On admission (7/10/20), pt found to have elevated AST up to 500 today and 370s of ALT. Platelet: 80k,    Otherwise normal INR, ALP and T. Bili     TMP-SMZ can cause idiosyncratic liver injury that has features of drug-allergy or hypersensitivity.   The pattern of injury is typically cholestatic or mixed.   TMP-SMZ has been linked to cases of hepatocellular injury that can be severe and lead to acute liver failure. However, most cases resolve rapidly, usually within 2 to 4 weeks     impression:  # Elevated liver enzymes- R factor 11.2 suggestive of hepatocellular injury, likely due to DILI from Bactrim. DDx: sepsis, viral infection, AIH, cholecystitis   # Pancytopenia, fever, SUE likely represent adverse reactions to bactrim   # Right hand 1st digit abscess s/p I&D     Recommendations:  - Daily CMP and INR  - Agree with ID to discontinue bactrim.  - obtain abdominal US to evaluate GB, liver and biliary tree  - obtain HBsAb, HBsAg, HBcAb, HCV Ab, HAV IgG/IgM to r/o viral hepatitides   - obtain RICHIE, ASMA, LKM Ab, quant IgM, IgA, IgG, AMA to r/o autoimmune disease  - Supportive care as per primary team      Lisette Payne   Gastroenterology Fellow  Pager: [phone]  Please page GI (Pager: [phone]) or if there are any additional questions or concerns.   Please call on-call GI fellow after 5pm and before 8am, and on weekends. 55 y/o F PMHx HTN, recent right hand 1st digit abscess s/p I&D p/w fevers x three day, severe sepsis, SUE and metabolic acidosis. She was finishing a course of Bactrim for MRSA cellulitis.  Wound still with erythema and some mild swelling, likely ongoing course of initial infection.    Hepatology consulted for elevated liver enzymes.  Patient with normal liver enzymes prior to this admission ( 06/2020)   On admission (7/10/20), pt found to have elevated AST up to 500 today and 370s of ALT. Platelet: 80k,    Otherwise normal INR, ALP and T. Bili     TMP-SMZ can cause idiosyncratic liver injury that has features of drug-allergy or hypersensitivity.   The pattern of injury is typically cholestatic or mixed.   TMP-SMZ has been linked to cases of hepatocellular injury that can be severe and lead to acute liver failure. However, most cases resolve rapidly, usually within 2 to 4 weeks     impression:  # Elevated liver enzymes- R factor 11.2 suggestive of hepatocellular injury, likely due to DILI from Bactrim. DDx: sepsis, viral infection (elevated COVID-19 Ab), AIH, cholecystitis   # Pancytopenia, fever, SUE, rash  likely represent hypersensitivity reaction to bactrim.   # Right hand 1st digit abscess s/p I&D   # Elevated COVID-19 Ab    Recommendations:  - Daily CMP and INR  - Discontinue bactrim.  - obtain abdominal US to evaluate GB, liver and biliary tree  - obtain HBsAb, HBsAg, HBcAb, HCV Ab, HAV IgG/IgM to r/o viral hepatitides   - obtain RICHIE, ASMA, LKM Ab, quant IgM, IgA, IgG, AMA to r/o autoimmune disease  - Supportive care as per primary team      Lisette Payne   Gastroenterology Fellow  Pager: [phone]  Please page GI (Pager: [phone]) or if there are any additional questions or concerns.   Please call on-call GI fellow after 5pm and before 8am, and on weekends. 53 y/o F PMHx HTN, recent right hand 1st digit abscess s/p I&D on 06/26/20 admitted for fevers x three day, elevated liver enzymes, SUE and metabolic acidosis.   Patient was recently hospitalized June 26-30 for sepsis 2/2 right hand 1st digit abscess s/p I&D on 06/26/20  She was discharged home on Bactrim on 06/30. ( Wound culture grew MRSA)  About 7-9 days later she started having fevers, itching, rash on her upper chest and abdominal discomfort.      Hepatology consulted for elevated liver enzymes.  Patient with normal liver enzymes prior to this admission ( 06/2020)   On admission (7/10/20), pt found to have elevated AST up to 500 and 370s of ALT. Platelet: 80k,    Otherwise normal INR, ALP and T. Bili     TMP-SMZ can cause idiosyncratic liver injury that has features of drug-allergy or hypersensitivity. The pattern of injury is typically cholestatic or mixed.   TMP-SMZ has been linked to cases of hepatocellular injury that can be severe and lead to acute liver failure. However, most cases resolve rapidly, usually within 2 to 4 weeks     impression:  # Elevated liver enzymes- R factor 11.2 suggestive of hepatocellular injury, likely due to DILI from Bactrim. DDx: sepsis, viral infection (elevated COVID-19 Ab), AIH, cholecystitis   # Pancytopenia, fever, SUE, rash  likely represent hypersensitivity reaction to bactrim.   # Right hand 1st digit abscess s/p I&D   # Elevated COVID-19 Ab    Recommendations:  - Daily CMP and INR  - Discontinue bactrim.  - obtain abdominal US to evaluate GB, liver and biliary tree  - obtain HBsAb, HBsAg, HBcAb, HCV Ab, HAV IgG/IgM to r/o viral hepatitides   - obtain RICHIE, ASMA, LKM Ab, quant IgM, IgA, IgG, AMA to r/o autoimmune disease  - Supportive care as per primary team      Lisette Payne   Gastroenterology Fellow  Pager: [phone]  Please page GI (Pager: [phone]) or if there are any additional questions or concerns.   Please call on-call GI fellow after 5pm and before 8am, and on weekends. 53 y/o F PMHx HTN, recent right hand 1st digit abscess s/p I&D on 06/26/20 admitted for fevers x three day, elevated liver enzymes, SUE and metabolic acidosis.   Patient was recently hospitalized June 26-30 for sepsis 2/2 right hand 1st digit abscess s/p I&D on 06/26/20  She was discharged home on Bactrim on 06/30. ( Wound culture grew MRSA)  About 7-9 days later she started having fevers, itching, rash on her upper chest and abdominal discomfort.      Hepatology consulted for elevated liver enzymes.  Patient with normal liver enzymes prior to this admission ( 06/2020)   On admission (7/10/20), pt found to have elevated AST up to 500 and 370s of ALT. Platelet: 80k,    Otherwise normal INR, ALP and T. Bili     TMP-SMZ can cause idiosyncratic liver injury that has features of drug-allergy or hypersensitivity. The pattern of injury is typically cholestatic or mixed.   TMP-SMZ has been linked to cases of hepatocellular injury that can be severe and lead to acute liver failure. However, most cases resolve rapidly, usually within 2 to 4 weeks     impression:  # Elevated liver enzymes- R factor 11.2 suggestive of hepatocellular injury, likely due to DILI from Bactrim ( Possible DRESS) . DDx: sepsis, viral infection (elevated COVID-19 Ab), AIH, cholecystitis   # Pancytopenia, fever, SUE, rash likely represent hypersensitivity reaction to bactrim. ( RegiSCAR score is 2 = possible DRESS) No eosinophilia   # Right hand 1st digit abscess s/p I&D   # Elevated COVID-19 Ab    Recommendations:  - Daily CMP and INR  - Discontinue bactrim.  - obtain abdominal US to evaluate GB, liver and biliary tree  - obtain HBsAb, HBsAg, HBcAb, HCV Ab, HAV IgG/IgM to r/o viral hepatitides   - obtain RICHIE, ASMA, LKM Ab, quant IgM, IgA, IgG, AMA to r/o autoimmune disease  - Supportive care as per primary team  - Hold off prednisone / immunodepression for possible DRESS- given she still has active infection and she is improving ( SUE resolved, liver enzymes started to improve)     Lisette Payne   Gastroenterology Fellow  Pager: [phone]  Please page GI (Pager: [phone]) or if there are any additional questions or concerns.   Please call on-call GI fellow after 5pm and before 8am, and on weekends.

## 2020-07-11 NOTE — PROGRESS NOTE ADULT - PROBLEM SELECTOR PLAN 3
Pt p/w Cr 2.48 (baseline is 0.75) with metabolic acidosis likely due to bacrim use vs infxn  - cont bicarb drip as above   - cont to monitor Cr with CMP q8h  - avoid nephrotoxic agents and renally dose meds Pt p/w Cr 2.48 (baseline is 0.75) with metabolic acidosis likely due to bactrim use vs infxn - improving with IVFs, transition off bicarb gtt to LR, f/u  - avoid nephrotoxic agents and renally dose meds

## 2020-07-11 NOTE — CONSULT NOTE ADULT - PROBLEM SELECTOR RECOMMENDATION 2
Pt with hypovolemic hypo-osmolar hyponatremia secondary to decreased PO intake. Serum Na: 122 on admission. Pt started on IVF with serum Na improved to 129 today. Continue IVF hydration. Encourage PO (osmolar intake). Check urine electrolytes with UOsm and Serum Osm. Check TSH and cortisol. Monitor serum Na. Avoid overcorrection >6-8meq in 24 hours. (4) excellent

## 2020-07-11 NOTE — PROGRESS NOTE ADULT - ATTENDING COMMENTS
I personally saw and examined the patient.  Discussed with the resident physician and agree with the resident's findings and plan as documented above.  Patient recently admitted for R hand 1st digit abscess, discharged on PO Bactrim, developed fever, return with multiorgan dysfunction, hypotension, SUE, metabolic acidosis, transaminitis, concerning for sepsis, drug reaction.  Bactrim stopped, narrowed to vanco for cellulitis.  Mild erythematous macular rash on truck observed, no eosinophilia noted; hypotension responding to IVF resuscitation.  SUE, metabolic acidosis improving, transitioning off bicarb gtt.  Follow up closely.

## 2020-07-11 NOTE — CONSULT NOTE ADULT - ASSESSMENT
55 y/o F PMHx HTN, recent right hand 1st digit abscess s/p I&D p/w fevers x three day, severe sepsis, SUE and metabolic acidosis.    Severe sepsis with recent cellulitis- was completing course of Bactrim for MRSA cellulitis. Likely with drug-induced fevers due to bactrim, as she also has pancytopenia and transaminitis. Wound still with erythema and some mild swelling, likely ongoing course of initial infection.  - Cont MRSA coverage with Vancomycin, agree with dose by level  - f/u blood cultures  - d/c cefepime based on prior wound cultures  - no further bactrim, list as intolerance?  - no need for repeat imaging for now

## 2020-07-11 NOTE — CONSULT NOTE ADULT - SUBJECTIVE AND OBJECTIVE BOX
Patient is a 54y old  Female who presents with a chief complaint of fever (2020 10:54)    HPI:  Pt is a 53 y/o F PMHx HTN, recent right hand 1st digit abscess s/p I&D p/w fevers x three days.  Pt states she has had fevers Tmax 103 for past three days.  She spoke with Dr. Connor on the phone yesterday who urged her to go to urgent care yesterday.  She again called Dr. Connor today who directed pt to ED this morning.  She reports she had dressing changed by Dr. Connor 4 days ago.  She notes she has been compliant with Bactrim.  Since yesterday, pt reports mild substernal and epigastric aching pain, nonradiating, nonpleuritic, nonexertional, nonpositional, which she attributes to nausea 2/2 Bactrim.  Pt notes burning dysuria.  Pt denies any headache, SOB, palpitations, cough, sore throat, diarrhea, constipation, neck pain/stiffness, dizziness, lightheadedness, or any other specific complaints. (10 Jul 2020 14:42)     prior hospital charts reviewed [  ]  primary team notes reviewed [  ]  other consultant notes reviewed [  ]  PAST MEDICAL & SURGICAL HISTORY:  Arthritis  HTN (hypertension)  No pertinent past medical history  No significant past surgical history    Allergies  No Known Allergies    ANTIMICROBIALS (past 90 days)  MEDICATIONS  (STANDING):    cefepime   IVPB   100 mL/Hr IV Intermittent (07-10-20 @ 19:20)    cefepime   IVPB   100 mL/Hr IV Intermittent (20 @ 05:19)    piperacillin/tazobactam IVPB...   200 mL/Hr IV Intermittent (07-10-20 @ 12:26)    vancomycin  IVPB   250 mL/Hr IV Intermittent (07-10-20 @ 13:41)      ANTIMICROBIALS:    cefepime   IVPB    cefepime   IVPB 1000 every 12 hours  vancomycin  IVPB 1250 once    OTHER MEDS: MEDICATIONS  (STANDING):    SOCIAL HISTORY:   hx smoking  non-smoker    FAMILY HISTORY:  No pertinent family history in first degree relatives    REVIEW OF SYSTEMS  [  ] ROS unobtainable because:    [  ] All other systems negative except as noted below:	    Constitutional:  [ ] fever [ ] chills  [ ] weight loss  [ ] weakness  Skin:  [ ] rash [ ] phlebitis	  Eyes: [ ] icterus [ ] pain  [ ] discharge	  ENMT: [ ] sore throat  [ ] thrush [ ] ulcers [ ] exudates  Respiratory: [ ] dyspnea [ ] hemoptysis [ ] cough [ ] sputum	  Cardiovascular:  [ ] chest pain [ ] palpitations [ ] edema	  Gastrointestinal:  [ ] nausea [ ] vomiting [ ] diarrhea [ ] constipation [ ] pain	  Genitourinary:  [ ] dysuria [ ] frequency [ ] hematuria [ ] discharge [ ] flank pain  [ ] incontinence  Musculoskeletal:  [ ] myalgias [ ] arthralgias [ ] arthritis  [ ] back pain  Neurological:  [ ] headache [ ] seizures  [ ] confusion/altered mental status  Psychiatric:  [ ] anxiety [ ] depression	  Hematology/Lymphatics:  [ ] lymphadenopathy  Endocrine:  [ ] adrenal [ ] thyroid  Allergic/Immunologic:	 [ ] transplant [ ] seasonal    Vital Signs Last 24 Hrs  T(F): 98.3 (20 @ 08:38), Max: 102.1 (20 @ 00:22)  Vital Signs Last 24 Hrs  HR: 96 (20 @ 08:38) (70 - 99)  BP: 90/63 (20 @ 08:38) (81/47 - 110/70)  RR: 18 (20 @ 08:38)  SpO2: 100% (20 @ 08:38) (96% - 100%)  Wt(kg): --    EXAM:  Constitutional: Not in acute distress  Eyes: pupils bilaterally reactive to light. No icterus.  Oral cavity: Clear, no lesions  Neck: No neck vein distension noted  RS: Chest clear to auscultation bilaterally. No wheeze/rhonchi/crepitations.  CVS: S1, S2 heard. Regular rate and rhythm. No murmurs/rubs/gallops.  Abdomen: Soft. No guarding/rigidity/tenderness.  : No acute abnormalities  Extremities: Warm. No pedal edema  Skin: No lesions noted  Vascular: No evidence of phlebitis  Neuro: Alert, oriented to time/place/person                          10.1   2.58  )-----------( 75       ( 2020 05:00 )             30.4         129<L>  |  105  |  24<H>  ----------------------------<  104<H>  4.4   |  16<L>  |  1.49<H>    Ca    7.7<L>      2020 05:00  Phos  2.8     -  Mg     1.7     07-11    TPro  5.2<L>  /  Alb  2.8<L>  /  TBili  < 0.2<L>  /  DBili  x   /  AST  460<H>  /  ALT  342<H>  /  AlkPhos  74  07-11    Urinalysis Basic - ( 10 Jul 2020 13:22 )    Color: LIGHT YELLOW / Appearance: Lt TURBID / S.015 / pH: 6.0  Gluc: NEGATIVE / Ketone: NEGATIVE  / Bili: NEGATIVE / Urobili: NORMAL   Blood: NEGATIVE / Protein: 50 / Nitrite: NEGATIVE   Leuk Esterase: NEGATIVE / RBC: 3-5 / WBC 6-10   Sq Epi: FEW / Non Sq Epi: x / Bacteria: FEW    MICROBIOLOGY:Vancomycin Level, Random: 9.5 ( @ 05:00)    Culture - Urine (collected 10 Jul 2020 13:55)  Source: .Urine Clean Catch (Midstream)  Final Report (2020 09:05):    <10,000 CFU/mL Normal Urogenital Anjali          Culture - Abscess with Gram Stain (20 @ 20:24)    -  Ampicillin/Sulbactam: R 16/8    -  Cefazolin: R <=4    -  Clindamycin: S <=0.25    -  Daptomycin: S 1    -  Erythromycin: R >4    -  Gentamicin: S <=1 Should not be used as monotherapy    -  Linezolid: S 2    -  Oxacillin: R >2    -  Penicillin: R >8    -  RIF- Rifampin: S <=1 Should not be used as monotherapy    -  Tetra/Doxy: S <=1    -  Trimethoprim/Sulfamethoxazole: S <=0.5/9.5    -  Vancomycin: S 2    Specimen Source: .Abscess thumb - right    Culture Results:   Few Methicillin resistant Staphylococcus aureus    Organism Identification: Methicillin resistant Staphylococcus aureus    Organism: Methicillin resistant Staphylococcus aureus    Method Type: LAURITA              RADIOLOGY:  imaging below personally reviewed    < from: MR Hand w/wo IV Cont, Right (20 @ 14:38) >  IMPRESSION: Extensive edema and rim-enhancing fluid in the subcutaneous tissues of the thumb as described above, concerning for cellulitis and likely abscess, with some component of likely postprocedural change. Fluid collections may communicate to the skin surface; correlate clinically. Small effusion in the interphalangeal joint.    Minimal T2 hyperintense signal in the thumb distal and proximal phalanges, without confluent low T1 signal. Question reactive edema. Correlate clinically to exclude subtle early osteomyelitis.    < end of copied text >    OTHER TESTS: Patient is a 54y old  Female who presents with a chief complaint of fever (2020 10:54)    HPI:  Pt is a 53 y/o F PMHx HTN, recent right hand 1st digit abscess s/p I&D p/w fevers x three days.  Pt states she has had fevers Tmax 103 for past three days.  She spoke with Dr. Connor on the phone yesterday who urged her to go to urgent care yesterday.  She again called Dr. Connor today who directed pt to ED this morning.  She reports she had dressing changed by Dr. Connor 4 days ago.  She notes she has been compliant with Bactrim.  Since yesterday, pt reports mild substernal and epigastric aching pain, nonradiating, nonpleuritic, nonexertional, nonpositional, which she attributes to nausea 2/2 Bactrim.  Pt notes burning dysuria.  Pt denies any headache, SOB, palpitations, cough, sore throat, diarrhea, constipation, neck pain/stiffness, dizziness, lightheadedness, or any other specific complaints. (10 Jul 2020 14:42)     Wound has continued to improve since discharge without further drainage or swelling. No loss of function or sensation reported.     prior hospital charts reviewed [ x ]  primary team notes reviewed [x  ]  other consultant notes reviewed [ x ]  PAST MEDICAL & SURGICAL HISTORY:  Arthritis  HTN (hypertension)  No pertinent past medical history  No significant past surgical history    Allergies  No Known Allergies    ANTIMICROBIALS (past 90 days)  MEDICATIONS  (STANDING):    cefepime   IVPB   100 mL/Hr IV Intermittent (07-10-20 @ 19:20)    cefepime   IVPB   100 mL/Hr IV Intermittent (20 @ 05:19)    piperacillin/tazobactam IVPB...   200 mL/Hr IV Intermittent (07-10-20 @ 12:26)    vancomycin  IVPB   250 mL/Hr IV Intermittent (07-10-20 @ 13:41)      ANTIMICROBIALS:    cefepime   IVPB    cefepime   IVPB 1000 every 12 hours  vancomycin  IVPB 1250 once    OTHER MEDS: MEDICATIONS  (STANDING):    SOCIAL HISTORY:   hx smoking  non-smoker    FAMILY HISTORY:  No pertinent family history in first degree relatives    REVIEW OF SYSTEMS  [  ] ROS unobtainable because:    [x ] All other systems negative except as noted below:	    Constitutional:  [x ] fever [ ] chills  [ ] weight loss  [x ] weakness  Skin:  [ ] rash [ ] phlebitis	  Eyes: [ ] icterus [ ] pain  [ ] discharge	  ENMT: [ ] sore throat  [ ] thrush [ ] ulcers [ ] exudates  Respiratory: [ ] dyspnea [ ] hemoptysis [ ] cough [ ] sputum	  Cardiovascular:  [ ] chest pain [ ] palpitations [ ] edema	  Gastrointestinal:  [x ] nausea [ ] vomiting [ ] diarrhea [ ] constipation [ ] pain	  Genitourinary:  [ ] dysuria [ ] frequency [ ] hematuria [ ] discharge [ ] flank pain  [ ] incontinence  Musculoskeletal:  [ ] myalgias [ ] arthralgias [ ] arthritis  [ ] back pain  Neurological:  [ ] headache [ ] seizures  [ ] confusion/altered mental status  Psychiatric:  [ ] anxiety [ ] depression	  Hematology/Lymphatics:  [ ] lymphadenopathy  Endocrine:  [ ] adrenal [ ] thyroid  Allergic/Immunologic:	 [ ] transplant [ ] seasonal    Vital Signs Last 24 Hrs  T(F): 98.3 (20 @ 08:38), Max: 102.1 (20 @ 00:22)  Vital Signs Last 24 Hrs  HR: 96 (20 @ 08:38) (70 - 99)  BP: 90/63 (20 @ 08:38) (81/47 - 110/70)  RR: 18 (20 @ 08:38)  SpO2: 100% (20 @ 08:38) (96% - 100%)  Wt(kg): --    EXAM:  Constitutional: Not in acute distress  Eyes: pupils bilaterally reactive to light. No icterus.  Oral cavity: Clear, no lesions  Neck: No neck vein distension noted  RS: Chest clear to auscultation bilaterally. No wheeze/rhonchi/crepitations.  CVS: S1, S2 heard. Regular rate and rhythm. No murmurs/rubs/gallops.  Abdomen: Soft. No guarding/rigidity/tenderness.  : No acute abnormalities  Extremities: Warm. No pedal edema  Skin: redness through out face and upper torso, blanching skin. R thumb thumb warm to touch, with medial granulation tissue apprx 1.5inc in length, nontender  Vascular: No evidence of phlebitis  Neuro: Alert, oriented to time/place/person                          10.1   2.58  )-----------( 75       ( 2020 05:00 )             30.4     07-11    129<L>  |  105  |  24<H>  ----------------------------<  104<H>  4.4   |  16<L>  |  1.49<H>    Ca    7.7<L>      2020 05:00  Phos  2.8     -  Mg     1.7         TPro  5.2<L>  /  Alb  2.8<L>  /  TBili  < 0.2<L>  /  DBili  x   /  AST  460<H>  /  ALT  342<H>  /  AlkPhos  74  0711    Urinalysis Basic - ( 10 Jul 2020 13:22 )    Color: LIGHT YELLOW / Appearance: Lt TURBID / S.015 / pH: 6.0  Gluc: NEGATIVE / Ketone: NEGATIVE  / Bili: NEGATIVE / Urobili: NORMAL   Blood: NEGATIVE / Protein: 50 / Nitrite: NEGATIVE   Leuk Esterase: NEGATIVE / RBC: 3-5 / WBC 6-10   Sq Epi: FEW / Non Sq Epi: x / Bacteria: FEW    MICROBIOLOGY:Vancomycin Level, Random: 9.5 ( @ 05:00)    Culture - Urine (collected 10 Jul 2020 13:55)  Source: .Urine Clean Catch (Midstream)  Final Report (2020 09:05):    <10,000 CFU/mL Normal Urogenital Anjali          Culture - Abscess with Gram Stain (20 @ 20:24)    -  Ampicillin/Sulbactam: R 16/8    -  Cefazolin: R <=4    -  Clindamycin: S <=0.25    -  Daptomycin: S 1    -  Erythromycin: R >4    -  Gentamicin: S <=1 Should not be used as monotherapy    -  Linezolid: S 2    -  Oxacillin: R >2    -  Penicillin: R >8    -  RIF- Rifampin: S <=1 Should not be used as monotherapy    -  Tetra/Doxy: S <=1    -  Trimethoprim/Sulfamethoxazole: S <=0.5/9.5    -  Vancomycin: S 2    Specimen Source: .Abscess thumb - right    Culture Results:   Few Methicillin resistant Staphylococcus aureus    Organism Identification: Methicillin resistant Staphylococcus aureus    Organism: Methicillin resistant Staphylococcus aureus    Method Type: LAURITA              RADIOLOGY:  imaging below personally reviewed    < from: MR Hand w/wo IV Cont, Right (20 @ 14:38) >  IMPRESSION: Extensive edema and rim-enhancing fluid in the subcutaneous tissues of the thumb as described above, concerning for cellulitis and likely abscess, with some component of likely postprocedural change. Fluid collections may communicate to the skin surface; correlate clinically. Small effusion in the interphalangeal joint.    Minimal T2 hyperintense signal in the thumb distal and proximal phalanges, without confluent low T1 signal. Question reactive edema. Correlate clinically to exclude subtle early osteomyelitis.    < end of copied text >    OTHER TESTS:

## 2020-07-11 NOTE — CONSULT NOTE ADULT - PROBLEM SELECTOR RECOMMENDATION 3
Pt with metabolic acidosis in the setting of SUE. Serum bicarbonate 7 on admission with pH: 7.2 Pt started on IV Sodium bicarbonate 150meq in D5W at 75 cc/hour. Serum bicarbonate improved to 16 today. Recommend continue IV Sodium bicarbonate infusion for now. Recheck BMP with VBG now. If Bicarbonate increasing, can discontinue IV sodium bicarbonate infusion and switch to LR at 100cc/hour.     Taco Rao  Nephrology Fellow  Pager: [phone]

## 2020-07-11 NOTE — PROVIDER CONTACT NOTE (OTHER) - SITUATION
pt has an open skin absess that is slightly draining; was not sure if I should cover up the wound of the would or leave open to air

## 2020-07-11 NOTE — CONSULT NOTE ADULT - ATTENDING COMMENTS
54 year old with recent left hand MRSA abscess presents with fever.    1) Left hand abscess/ cellulitis  Prior cx with MRSA    Currently, I do not see a residual abscess  Still has open wound    Tx with vancomycin for cellulitis  Can stop cefepime    Suspicion for underlying OM is low- but if not improving would need repeat MRI    2)Fever/ rash/ transaminitis/ SUE/ cytopenia    Check blood culture to rule out sepsis.    But I am concerned that this may be due to a Bactrim reaction.     Stop bactrim    Monitor.
SUE   Acidosis  Hyponatremia    Plan as above

## 2020-07-11 NOTE — PROGRESS NOTE ADULT - PROBLEM SELECTOR PLAN 6
cause of previous admission two weeks ago as described above  - Plastic surgeon, Dr. Sullivan notified that patient is in hospital and will see pt for further workup, cont with vancomycin dosed by level cause of previous admission two weeks ago as described above  - appreciate Dr. Connor's input, R thumb clinically improved, WC as recommended

## 2020-07-11 NOTE — CONSULT NOTE ADULT - PROBLEM SELECTOR RECOMMENDATION 9
Pt with hemoynamically mediated SUE in the setting of hypotension and infection. Pt also with SUE in the setting of medication use. Pt previously on Bactrim for right hand infection and Ramipril. On admission, pt with SCr: 2.48 (7/10/20). Pt started on IVF with Scr improved to 1.49 today (7/11/20). Pt with ATN? UA with 50 protein, no hematuria. Pt  non-oliguric. Recommend renal ultrasound. Agree with IV fluids for now. Check BMP now with VBG. Monitor labs and urine output. Agree to hold Ramipril. Avoid NSAIDs, ACEI/ARBS, RCA and nephrotoxins. Dose medications as per eGFR.

## 2020-07-12 LAB
ALBUMIN SERPL ELPH-MCNC: 2.5 G/DL — LOW (ref 3.3–5)
ALP SERPL-CCNC: 96 U/L — SIGNIFICANT CHANGE UP (ref 40–120)
ALT FLD-CCNC: 377 U/L — HIGH (ref 4–33)
ANION GAP SERPL CALC-SCNC: 11 MMO/L — SIGNIFICANT CHANGE UP (ref 7–14)
AST SERPL-CCNC: 432 U/L — HIGH (ref 4–32)
BASOPHILS # BLD AUTO: 0.01 K/UL — SIGNIFICANT CHANGE UP (ref 0–0.2)
BASOPHILS NFR BLD AUTO: 0.5 % — SIGNIFICANT CHANGE UP (ref 0–2)
BASOPHILS NFR SPEC: 0 % — SIGNIFICANT CHANGE UP (ref 0–2)
BILIRUB SERPL-MCNC: < 0.2 MG/DL — LOW (ref 0.2–1.2)
BUN SERPL-MCNC: 10 MG/DL — SIGNIFICANT CHANGE UP (ref 7–23)
CALCIUM SERPL-MCNC: 7.6 MG/DL — LOW (ref 8.4–10.5)
CHLORIDE SERPL-SCNC: 98 MMOL/L — SIGNIFICANT CHANGE UP (ref 98–107)
CO2 SERPL-SCNC: 19 MMOL/L — LOW (ref 22–31)
CREAT SERPL-MCNC: 0.84 MG/DL — SIGNIFICANT CHANGE UP (ref 0.5–1.3)
EOSINOPHIL # BLD AUTO: 0.03 K/UL — SIGNIFICANT CHANGE UP (ref 0–0.5)
EOSINOPHIL NFR BLD AUTO: 1.4 % — SIGNIFICANT CHANGE UP (ref 0–6)
EOSINOPHIL NFR FLD: 0 % — SIGNIFICANT CHANGE UP (ref 0–6)
GLUCOSE SERPL-MCNC: 110 MG/DL — HIGH (ref 70–99)
HCT VFR BLD CALC: 29.7 % — LOW (ref 34.5–45)
HGB BLD-MCNC: 10.4 G/DL — LOW (ref 11.5–15.5)
IMM GRANULOCYTES NFR BLD AUTO: 1.4 % — SIGNIFICANT CHANGE UP (ref 0–1.5)
LACTATE SERPL-SCNC: 3.8 MMOL/L — HIGH (ref 0.5–2)
LYMPHOCYTES # BLD AUTO: 1.14 K/UL — SIGNIFICANT CHANGE UP (ref 1–3.3)
LYMPHOCYTES # BLD AUTO: 52.3 % — HIGH (ref 13–44)
LYMPHOCYTES NFR SPEC AUTO: 69 % — HIGH (ref 13–44)
MAGNESIUM SERPL-MCNC: 1.4 MG/DL — LOW (ref 1.6–2.6)
MANUAL SMEAR VERIFICATION: SIGNIFICANT CHANGE UP
MCHC RBC-ENTMCNC: 30.3 PG — SIGNIFICANT CHANGE UP (ref 27–34)
MCHC RBC-ENTMCNC: 35 % — SIGNIFICANT CHANGE UP (ref 32–36)
MCV RBC AUTO: 86.6 FL — SIGNIFICANT CHANGE UP (ref 80–100)
MONOCYTES # BLD AUTO: 0.18 K/UL — SIGNIFICANT CHANGE UP (ref 0–0.9)
MONOCYTES NFR BLD AUTO: 8.3 % — SIGNIFICANT CHANGE UP (ref 2–14)
MONOCYTES NFR BLD: 4 % — SIGNIFICANT CHANGE UP (ref 2–9)
NEUTROPHIL AB SER-ACNC: 11 % — LOW (ref 43–77)
NEUTROPHILS # BLD AUTO: 0.79 K/UL — LOW (ref 1.8–7.4)
NEUTROPHILS NFR BLD AUTO: 36.1 % — LOW (ref 43–77)
NEUTS BAND # BLD: 16 % — HIGH (ref 0–6)
NRBC # BLD: 0 /100WBC — SIGNIFICANT CHANGE UP
NRBC # FLD: 0 K/UL — SIGNIFICANT CHANGE UP (ref 0–0)
PHOSPHATE SERPL-MCNC: 2 MG/DL — LOW (ref 2.5–4.5)
PLATELET # BLD AUTO: 90 K/UL — LOW (ref 150–400)
PLATELET COUNT - ESTIMATE: SIGNIFICANT CHANGE UP
PMV BLD: 11.2 FL — SIGNIFICANT CHANGE UP (ref 7–13)
POTASSIUM SERPL-MCNC: 4.2 MMOL/L — SIGNIFICANT CHANGE UP (ref 3.5–5.3)
POTASSIUM SERPL-SCNC: 4.2 MMOL/L — SIGNIFICANT CHANGE UP (ref 3.5–5.3)
PROT SERPL-MCNC: 5 G/DL — LOW (ref 6–8.3)
RBC # BLD: 3.43 M/UL — LOW (ref 3.8–5.2)
RBC # FLD: 15 % — HIGH (ref 10.3–14.5)
SMUDGE CELLS # BLD: PRESENT — SIGNIFICANT CHANGE UP
SODIUM SERPL-SCNC: 128 MMOL/L — LOW (ref 135–145)
VANCOMYCIN FLD-MCNC: 3.2 UG/ML — SIGNIFICANT CHANGE UP
WBC # BLD: 2.18 K/UL — LOW (ref 3.8–10.5)
WBC # FLD AUTO: 2.18 K/UL — LOW (ref 3.8–10.5)

## 2020-07-12 PROCEDURE — 99254 IP/OBS CNSLTJ NEW/EST MOD 60: CPT | Mod: GC

## 2020-07-12 PROCEDURE — 99232 SBSQ HOSP IP/OBS MODERATE 35: CPT

## 2020-07-12 PROCEDURE — 99233 SBSQ HOSP IP/OBS HIGH 50: CPT

## 2020-07-12 PROCEDURE — 76700 US EXAM ABDOM COMPLETE: CPT | Mod: 26

## 2020-07-12 RX ORDER — VANCOMYCIN HCL 1 G
VIAL (EA) INTRAVENOUS
Refills: 0 | Status: DISCONTINUED | OUTPATIENT
Start: 2020-07-12 | End: 2020-07-14

## 2020-07-12 RX ORDER — MAGNESIUM SULFATE 500 MG/ML
1 VIAL (ML) INJECTION ONCE
Refills: 0 | Status: COMPLETED | OUTPATIENT
Start: 2020-07-12 | End: 2020-07-12

## 2020-07-12 RX ORDER — VANCOMYCIN HCL 1 G
1000 VIAL (EA) INTRAVENOUS EVERY 12 HOURS
Refills: 0 | Status: DISCONTINUED | OUTPATIENT
Start: 2020-07-12 | End: 2020-07-14

## 2020-07-12 RX ORDER — VANCOMYCIN HCL 1 G
1000 VIAL (EA) INTRAVENOUS ONCE
Refills: 0 | Status: COMPLETED | OUTPATIENT
Start: 2020-07-12 | End: 2020-07-12

## 2020-07-12 RX ADMIN — Medication 100 GRAM(S): at 09:23

## 2020-07-12 RX ADMIN — Medication 250 MILLIGRAM(S): at 09:22

## 2020-07-12 RX ADMIN — Medication 250 MILLIGRAM(S): at 17:40

## 2020-07-12 NOTE — PROGRESS NOTE ADULT - PROBLEM SELECTOR PLAN 1
P/w hypotension, bicarb 14, VBG showing pH of 7.22  - previous admission with right hand cellulitis, wound cultures grew MRSA and was treated with vanc/zosyn with last dose on 6/30, sent home on 14 day course of DS 160mg/800mg TMP/SMX PO bid, MRI of the hand on 6/30 showed mild early sign of osteomyelitis.   metabolic acidosis due to renal failure could be due to bactrim  - In ED pt got 2.1 L of NS and 1g vanc and zosyn  - recheck BMP and VBG after fluid resuscitation   - Start bicarb drip 150meq/L at 75cc/hr --> improved, appreciate renal eval, transition to LR, f/u  - Plan as below for infection   - plan as below for SUE P/w hypotension, bicarb 14, VBG showing pH of 7.22  - previous admission with right hand cellulitis, wound cultures grew MRSA and was treated with vanc/zosyn with last dose on 6/30, sent home on 14 day course of DS 160mg/800mg TMP/SMX PO bid, MRI of the hand on 6/30 showed mild early sign of osteomyelitis.   metabolic acidosis due to renal failure could be due to bactrim  - In ED pt got 2.1 L of NS and 1g vanc and zosyn  - S/p bicarb drip 150meq/L at 75cc/hr --> improved, appreciate renal eval, transition to LR  - Morning BMP and VBG  - Plan as below for infection   - plan as below for SUE

## 2020-07-12 NOTE — PROGRESS NOTE ADULT - PROBLEM SELECTOR PLAN 5
Plts 80, her baseline is 250's, possible DITP 2/2 bactrim use, pt did not receive heparin on last admission, therefore no suspicion for HIT  - hold heparin  - continue to monitor with CBC

## 2020-07-12 NOTE — PROGRESS NOTE ADULT - PROBLEM SELECTOR PLAN 3
Pt p/w Cr 2.48 (baseline is 0.75) with metabolic acidosis likely due to bactrim use vs infxn - improving with IVFs, transition off bicarb gtt to LR, f/u  - avoid nephrotoxic agents and renally dose meds  - encourage PO intake/ fluids  -monitor serum Na  - appreciate nephrology recs

## 2020-07-12 NOTE — PROGRESS NOTE ADULT - SUBJECTIVE AND OBJECTIVE BOX
Follow Up:      Interval History/ROS:Patient is a 54y old  Female who presents with a chief complaint of fever (2020 17:13)      Allergies    No Known Allergies    Intolerances        ANTIMICROBIALS:  vancomycin  IVPB 1000 once  vancomycin  IVPB 1000 every 12 hours  vancomycin  IVPB        OTHER MEDS:  lactated ringers. 1000 milliLiter(s) IV Continuous <Continuous>  magnesium sulfate  IVPB 1 Gram(s) IV Intermittent once  silver sulfADIAZINE 1% Cream 1 Application(s) Topical two times a day      Vital Signs Last 24 Hrs  T(C): 36.8 (2020 05:43), Max: 39.3 (2020 20:40)  T(F): 98.3 (2020 05:43), Max: 102.8 (2020 20:40)  HR: 98 (2020 05:43) (85 - 98)  BP: 116/76 (2020 05:43) (96/57 - 119/69)  BP(mean): --  RR: 18 (2020 05:43) (18 - 18)  SpO2: 99% (2020 05:43) (99% - 100%)    EXAM:  Constitutional: Not in acute distress  Eyes: No icterus. Pupils b/l reactive to light.  Oral cavity: Clear, no lesions  Neck: No neck vein distension noted  RS: Chest clear to auscultation bilaterally. No wheeze/rhonchi/crepitations.  CVS: S1, S2 heard. Regular rate and rhythm. No murmurs/rubs/gallops.  Abdomen: Soft. No guarding/rigidity/tenderness.  : No acute abnormalities  Extremities: Warm. No pedal edema  Skin: No lesions noted  Vascular: No evidence of phlebitis  Neuro: Alert, oriented to time/place/person                        10.4   2.18  )-----------( 90       ( 2020 06:35 )             29.7       07-12    128<L>  |  98  |  10  ----------------------------<  110<H>  4.2   |  19<L>  |  0.84    Ca    7.6<L>      2020 06:35  Phos  2.0     07-12  Mg     1.4     07-12    TPro  5.0<L>  /  Alb  2.5<L>  /  TBili  < 0.2<L>  /  DBili  x   /  AST  432<H>  /  ALT  377<H>  /  AlkPhos  96  07-12      Urinalysis Basic - ( 10 Jul 2020 13:22 )    Color: LIGHT YELLOW / Appearance: Lt TURBID / S.015 / pH: 6.0  Gluc: NEGATIVE / Ketone: NEGATIVE  / Bili: NEGATIVE / Urobili: NORMAL   Blood: NEGATIVE / Protein: 50 / Nitrite: NEGATIVE   Leuk Esterase: NEGATIVE / RBC: 3-5 / WBC 6-10   Sq Epi: FEW / Non Sq Epi: x / Bacteria: FEW        MICROBIOLOGY:Culture Results:   No growth to date. (07-10 @ 14:16)  Culture Results:   No growth to date. (07-10 @ 14:16)  Culture Results:   <10,000 CFU/mL Normal Urogenital Anjali (07-10 @ 13:55)      RADIOLOGY:    OTHER INVESTIGATIONS: Follow Up:      Interval History/ROS:Patient is a 54y old  Female who presents with a chief complaint of fever (2020 17:13)    C/o rash on her legs.  feels "achey"  Febrile last night    Allergies    No Known Allergies    Intolerances        ANTIMICROBIALS:  vancomycin  IVPB 1000 once  vancomycin  IVPB 1000 every 12 hours  vancomycin  IVPB        OTHER MEDS:  lactated ringers. 1000 milliLiter(s) IV Continuous <Continuous>  magnesium sulfate  IVPB 1 Gram(s) IV Intermittent once  silver sulfADIAZINE 1% Cream 1 Application(s) Topical two times a day      Vital Signs Last 24 Hrs  T(C): 36.8 (2020 05:43), Max: 39.3 (2020 20:40)  T(F): 98.3 (2020 05:43), Max: 102.8 (2020 20:40)  HR: 98 (2020 05:43) (85 - 98)  BP: 116/76 (2020 05:43) (96/57 - 119/69)  BP(mean): --  RR: 18 (2020 05:43) (18 - 18)  SpO2: 99% (2020 05:43) (99% - 100%)    EXAM:  Constitutional: Not in acute distress  Eyes: No icterus. Pupils b/l reactive to light.  Oral cavity: Clear, no lesions  Neck: No neck vein distension noted  RS: Chest clear to auscultation bilaterally. No wheeze/rhonchi/crepitations.  CVS: S1, S2 heard. Regular rate and rhythm. No murmurs/rubs/gallops.  Abdomen: Soft. No guarding/rigidity/tenderness.  : No acute abnormalities  Extremities: Warm. No pedal edema  Skin: + rash chests/ egs/ face  Vascular: No evidence of phlebitis  Neuro: Alert, oriented to time/place/person                        10.4   2.18  )-----------( 90       ( 2020 06:35 )             29.7       07-12    128<L>  |  98  |  10  ----------------------------<  110<H>  4.2   |  19<L>  |  0.84    Ca    7.6<L>      2020 06:35  Phos  2.0     07-12  Mg     1.4         TPro  5.0<L>  /  Alb  2.5<L>  /  TBili  < 0.2<L>  /  DBili  x   /  AST  432<H>  /  ALT  377<H>  /  AlkPhos  96  -      Urinalysis Basic - ( 10 Jul 2020 13:22 )    Color: LIGHT YELLOW / Appearance: Lt TURBID / S.015 / pH: 6.0  Gluc: NEGATIVE / Ketone: NEGATIVE  / Bili: NEGATIVE / Urobili: NORMAL   Blood: NEGATIVE / Protein: 50 / Nitrite: NEGATIVE   Leuk Esterase: NEGATIVE / RBC: 3-5 / WBC 6-10   Sq Epi: FEW / Non Sq Epi: x / Bacteria: FEW        MICROBIOLOGY:Culture Results:   No growth to date. (07-10 @ 14:16)  Culture Results:   No growth to date. (07-10 @ 14:16)  Culture Results:   <10,000 CFU/mL Normal Urogenital Anjali (07-10 @ 13:55)      RADIOLOGY:    OTHER INVESTIGATIONS:

## 2020-07-12 NOTE — PROGRESS NOTE ADULT - PROBLEM SELECTOR PLAN 6
cause of previous admission two weeks ago as described above  - appreciate Dr. Connor's input, R thumb clinically improved, WC as recommended  - BCx neg to date, continue to f/u  - cont vancomycin   - appreciate ID and Plastic recs

## 2020-07-12 NOTE — PROGRESS NOTE ADULT - ATTENDING COMMENTS
I personally saw and examined the patient.  Discussed with the resident physician and agree with the resident's findings and plan as documented above.  Patient recently admitted for R hand 1st digit abscess, discharged on PO Bactrim, developed fever, return with multiorgan dysfunction, hypotension, SUE, metabolic acidosis, transaminitis, concerning for sepsis, drug reaction.  Bactrim stopped, narrowed to vanco for cellulitis.  Mild erythematous macular rash on truck observed, no eosinophilia noted; hypotension responded to IVF resuscitation.  SUE, metabolic acidosis improving, transitioning off IVFs.  Pt instructed not to take Bactrim in future, add as an allergy. I personally saw and examined the patient.  Discussed with the resident physician and agree with the resident's findings and plan as documented above.  Patient recently admitted for R hand 1st digit abscess, discharged on PO Bactrim, developed fever, return with multiorgan dysfunction, hypotension, SUE, metabolic acidosis, transaminitis, concerning for sepsis, drug reaction.  Bactrim stopped, narrowed to vanco for cellulitis.  Mild erythematous macular rash on truck observed, no eosinophilia noted; hypotension responded to IVF resuscitation.  SUE, metabolic acidosis improving, transitioning off IVFs.  Transaminitis improving.  Pt instructed not to take Bactrim in future, add as an allergy.

## 2020-07-12 NOTE — PROGRESS NOTE ADULT - SUBJECTIVE AND OBJECTIVE BOX
PROGRESS NOTE:   Authored by Dr. Alvina Cruz, DONNELL pager [phone]    Patient is a 54y old  Female who presents with a chief complaint of fever (2020 08:52)      SUBJECTIVE / OVERNIGHT EVENTS: Pt is doing a little better today. Overnight she had a fever of 102.8 that resolved with tylenol. Today, she has some nausea  and vomited bile. Pt denies any chills, pain with urination, chest pain or SOB. She says that her hand pain is still the same, dull ache. Pt has not had BM in two days but denies any abd pain.    REVIEW OF SYSTEMS:    CONSTITUTIONAL: fever and chills  EYES/ENT: No visual changes;  No vertigo or throat pain   NECK: No pain or stiffness  BACK: no pain or lesions   RESPIRATORY: No cough, wheezing, hemoptysis; No shortness of breath  CARDIOVASCULAR: No chest pain or palpitations  GASTROINTESTINAL: No abdominal or epigastric pain. Pt endorses nausea, vomiting. Denies hematemesis; No diarrhea. Some constipation. No melena or hematochezia.  GENITOURINARY: No dysuria, frequency or hematuria  NEUROLOGICAL: No numbness or weakness  EXTREMITIES: right hand is swollen and painful   SKIN: No itching, rashes      MEDICATIONS  (STANDING):  lactated ringers. 1000 milliLiter(s) (100 mL/Hr) IV Continuous <Continuous>  silver sulfADIAZINE 1% Cream 1 Application(s) Topical two times a day  vancomycin  IVPB 1000 milliGRAM(s) IV Intermittent every 12 hours  vancomycin  IVPB        MEDICATIONS  (PRN):      CAPILLARY BLOOD GLUCOSE        I&O's Summary    2020 07:01  -  2020 07:00  --------------------------------------------------------  IN: 1300 mL / OUT: 0 mL / NET: 1300 mL        PHYSICAL EXAM:  Vital Signs Last 24 Hrs  T(C): 37.6 (2020 09:00), Max: 39.3 (2020 20:40)  T(F): 99.6 (2020 09:00), Max: 102.8 (2020 20:40)  HR: 93 (2020 09:00) (85 - 98)  BP: 125/88 (2020 09:00) (96/57 - 125/88)  BP(mean): --  RR: 18 (2020 09:00) (18 - 18)  SpO2: 100% (2020 09:00) (99% - 100%)    GENERAL: appears lethargic, well-developed  HEAD:  Atraumatic, Normocephalic  EYES: EOMI, conjunctiva and sclera clear  NECK: Supple, no lymphadenopathy, no JVD  CHEST/LUNG: CTAB; No wheezes, rales, or rhonchi  HEART: Regular rate and rhythm; No murmurs, rubs, or gallops  ABDOMEN: Soft, non-tender, non-distended; normal bowel sounds, no organomegaly  EXTREMITIES:  2+ peripheral pulses b/l, No clubbing, cyanosis, or edema  NEUROLOGY: A&O x 3, no focal deficits  SKIN: No rashes, right thumb was bandaged     LABS:                        10.4   2.18  )-----------( 90       ( 2020 06:35 )             29.7     07-12    128<L>  |  98  |  10  ----------------------------<  110<H>  4.2   |  19<L>  |  0.84    Ca    7.6<L>      2020 06:35  Phos  2.0     07-12  Mg     1.4     07-12    TPro  5.0<L>  /  Alb  2.5<L>  /  TBili  < 0.2<L>  /  DBili  x   /  AST  432<H>  /  ALT  377<H>  /  AlkPhos  96  07-12    lactate 3.8    PT/INR - ( 2020 05:00 )   PT: 12.8 SEC;   INR: 1.11          PTT - ( 2020 05:00 )  PTT:32.7 SEC  CARDIAC MARKERS ( 2020 11:36 )  x     / x     / 120 u/L / x     / x          Urinalysis Basic - ( 10 Jul 2020 13:22 )    Color: LIGHT YELLOW / Appearance: Lt TURBID / S.015 / pH: 6.0  Gluc: NEGATIVE / Ketone: NEGATIVE  / Bili: NEGATIVE / Urobili: NORMAL   Blood: NEGATIVE / Protein: 50 / Nitrite: NEGATIVE   Leuk Esterase: NEGATIVE / RBC: 3-5 / WBC 6-10   Sq Epi: FEW / Non Sq Epi: x / Bacteria: FEW        Culture - Blood (collected 10 Jul 2020 14:16)  Source: .Blood Blood-Venous  Preliminary Report (2020 15:03):    No growth to date.    Culture - Blood (collected 10 Jul 2020 14:16)  Source: .Blood Blood-Peripheral  Preliminary Report (2020 15:03):    No growth to date.    Culture - Urine (collected 10 Jul 2020 13:55)  Source: .Urine Clean Catch (Midstream)  Final Report (2020 09:05):    <10,000 CFU/mL Normal Urogenital Anjali        RADIOLOGY & ADDITIONAL TESTS:  Results Reviewed:   Imaging Personally Reviewed:  Electrocardiogram Personally Reviewed:    COORDINATION OF CARE:  Care Discussed with Consultants/Other Providers [Y/N]:  Prior or Outpatient Records Reviewed [Y/N]:

## 2020-07-12 NOTE — PROGRESS NOTE ADULT - PROBLEM SELECTOR PLAN 4
,  on admission likely DILI due to Bactrim   - downtrending AST/ALT  - appreciate Hepatology recs  - Daily CMP and INR

## 2020-07-12 NOTE — PROGRESS NOTE ADULT - ASSESSMENT
53 y/o F w/ PMH HTN and arthritis who presented two weeks ago with sepsis 2/2 purulent cellulitis sent home on bactrim who now presents with multiorgan dysfunction, metabolic acidosis, and hypotension most likely due to sepsis from purulent cellulitis vs Bactrim toxicity. 53 y/o F w/ PMH HTN and arthritis who presented two weeks ago with sepsis 2/2 purulent cellulitis sent home on bactrim who now presents with multiorgan dysfunction, metabolic acidosis, and hypotension most likely due to Bactrim toxicity.

## 2020-07-12 NOTE — PROGRESS NOTE ADULT - ATTENDING COMMENTS
54 year old with recent left hand MRSA abscess presents with fever.    1) Left hand abscess/ cellulitis  Prior cx with MRSA    Currently, I do not see a residual abscess  Still has open wound    Tx with vancomycin for cellulitis    Suspicion for underlying OM is low- but if not improving would need repeat MRI    2)Fever/ rash/ transaminitis/ SUE/ cytopenia    ? due to bactrim    Monitor.

## 2020-07-12 NOTE — PROGRESS NOTE ADULT - PROBLEM SELECTOR PLAN 2
BP's 80s/50s in setting of metabolic acidosis, unclear if this is related to infxn vs drug reaction.   - responding well to IVFs, f/u closely  - f/u blood cx, urine cx, UA negative; abx narrowed to vanco per ID

## 2020-07-13 PROBLEM — M19.90 UNSPECIFIED OSTEOARTHRITIS, UNSPECIFIED SITE: Chronic | Status: ACTIVE | Noted: 2020-06-26

## 2020-07-13 PROBLEM — I10 ESSENTIAL (PRIMARY) HYPERTENSION: Chronic | Status: ACTIVE | Noted: 2020-06-26

## 2020-07-13 LAB
ALBUMIN SERPL ELPH-MCNC: 2.6 G/DL — LOW (ref 3.3–5)
ALP SERPL-CCNC: 91 U/L — SIGNIFICANT CHANGE UP (ref 40–120)
ALT FLD-CCNC: 333 U/L — HIGH (ref 4–33)
ANION GAP SERPL CALC-SCNC: 9 MMO/L — SIGNIFICANT CHANGE UP (ref 7–14)
APPEARANCE UR: CLEAR — SIGNIFICANT CHANGE UP
AST SERPL-CCNC: 287 U/L — HIGH (ref 4–32)
BASE EXCESS BLDV CALC-SCNC: 1.4 MMOL/L — SIGNIFICANT CHANGE UP
BASOPHILS # BLD AUTO: 0.01 K/UL — SIGNIFICANT CHANGE UP (ref 0–0.2)
BASOPHILS NFR BLD AUTO: 0.4 % — SIGNIFICANT CHANGE UP (ref 0–2)
BILIRUB SERPL-MCNC: < 0.2 MG/DL — LOW (ref 0.2–1.2)
BILIRUB UR-MCNC: NEGATIVE — SIGNIFICANT CHANGE UP
BLOOD UR QL VISUAL: NEGATIVE — SIGNIFICANT CHANGE UP
BUN SERPL-MCNC: 5 MG/DL — LOW (ref 7–23)
CALCIUM SERPL-MCNC: 7.8 MG/DL — LOW (ref 8.4–10.5)
CHLORIDE SERPL-SCNC: 98 MMOL/L — SIGNIFICANT CHANGE UP (ref 98–107)
CO2 SERPL-SCNC: 22 MMOL/L — SIGNIFICANT CHANGE UP (ref 22–31)
COLOR SPEC: SIGNIFICANT CHANGE UP
CREAT SERPL-MCNC: 0.63 MG/DL — SIGNIFICANT CHANGE UP (ref 0.5–1.3)
EOSINOPHIL # BLD AUTO: 0.06 K/UL — SIGNIFICANT CHANGE UP (ref 0–0.5)
EOSINOPHIL NFR BLD AUTO: 2.4 % — SIGNIFICANT CHANGE UP (ref 0–6)
GAS PNL BLDV: 127 MMOL/L — LOW (ref 136–146)
GLUCOSE BLDV-MCNC: 162 MG/DL — HIGH (ref 70–99)
GLUCOSE SERPL-MCNC: 171 MG/DL — HIGH (ref 70–99)
GLUCOSE UR-MCNC: NEGATIVE — SIGNIFICANT CHANGE UP
HCO3 BLDV-SCNC: 26 MMOL/L — SIGNIFICANT CHANGE UP (ref 20–27)
HCT VFR BLD CALC: 30.2 % — LOW (ref 34.5–45)
HCT VFR BLDV CALC: 34.4 % — LOW (ref 34.5–45)
HGB BLD-MCNC: 10.1 G/DL — LOW (ref 11.5–15.5)
HGB BLDV-MCNC: 11.1 G/DL — LOW (ref 11.5–15.5)
IMM GRANULOCYTES NFR BLD AUTO: 0.4 % — SIGNIFICANT CHANGE UP (ref 0–1.5)
KETONES UR-MCNC: NEGATIVE — SIGNIFICANT CHANGE UP
LACTATE BLDV-MCNC: 1 MMOL/L — SIGNIFICANT CHANGE UP (ref 0.5–2)
LEUKOCYTE ESTERASE UR-ACNC: NEGATIVE — SIGNIFICANT CHANGE UP
LYMPHOCYTES # BLD AUTO: 1.13 K/UL — SIGNIFICANT CHANGE UP (ref 1–3.3)
LYMPHOCYTES # BLD AUTO: 45 % — HIGH (ref 13–44)
MANUAL SMEAR VERIFICATION: SIGNIFICANT CHANGE UP
MCHC RBC-ENTMCNC: 28.8 PG — SIGNIFICANT CHANGE UP (ref 27–34)
MCHC RBC-ENTMCNC: 33.4 % — SIGNIFICANT CHANGE UP (ref 32–36)
MCV RBC AUTO: 86 FL — SIGNIFICANT CHANGE UP (ref 80–100)
MONOCYTES # BLD AUTO: 0.29 K/UL — SIGNIFICANT CHANGE UP (ref 0–0.9)
MONOCYTES NFR BLD AUTO: 11.6 % — SIGNIFICANT CHANGE UP (ref 2–14)
NEUTROPHILS # BLD AUTO: 1.01 K/UL — LOW (ref 1.8–7.4)
NEUTROPHILS NFR BLD AUTO: 40.2 % — LOW (ref 43–77)
NITRITE UR-MCNC: NEGATIVE — SIGNIFICANT CHANGE UP
NRBC # FLD: 0 K/UL — SIGNIFICANT CHANGE UP (ref 0–0)
PCO2 BLDV: 35 MMHG — LOW (ref 41–51)
PH BLDV: 7.47 PH — HIGH (ref 7.32–7.43)
PH UR: 7 — SIGNIFICANT CHANGE UP (ref 5–8)
PLATELET # BLD AUTO: 101 K/UL — LOW (ref 150–400)
PMV BLD: 10.5 FL — SIGNIFICANT CHANGE UP (ref 7–13)
PO2 BLDV: 48 MMHG — HIGH (ref 35–40)
POTASSIUM BLDV-SCNC: 3.4 MMOL/L — SIGNIFICANT CHANGE UP (ref 3.4–4.5)
POTASSIUM SERPL-MCNC: 3.6 MMOL/L — SIGNIFICANT CHANGE UP (ref 3.5–5.3)
POTASSIUM SERPL-SCNC: 3.6 MMOL/L — SIGNIFICANT CHANGE UP (ref 3.5–5.3)
PROT SERPL-MCNC: 5.3 G/DL — LOW (ref 6–8.3)
PROT UR-MCNC: 10 — SIGNIFICANT CHANGE UP
RBC # BLD: 3.51 M/UL — LOW (ref 3.8–5.2)
RBC # FLD: 14.7 % — HIGH (ref 10.3–14.5)
SAO2 % BLDV: 84 % — SIGNIFICANT CHANGE UP (ref 60–85)
SODIUM SERPL-SCNC: 129 MMOL/L — LOW (ref 135–145)
SP GR SPEC: 1.01 — SIGNIFICANT CHANGE UP (ref 1–1.04)
UROBILINOGEN FLD QL: NORMAL — SIGNIFICANT CHANGE UP
VANCOMYCIN TROUGH SERPL-MCNC: 14.3 UG/ML — SIGNIFICANT CHANGE UP (ref 10–20)
WBC # BLD: 2.51 K/UL — LOW (ref 3.8–10.5)
WBC # FLD AUTO: 2.51 K/UL — LOW (ref 3.8–10.5)

## 2020-07-13 PROCEDURE — 99233 SBSQ HOSP IP/OBS HIGH 50: CPT

## 2020-07-13 PROCEDURE — 99232 SBSQ HOSP IP/OBS MODERATE 35: CPT

## 2020-07-13 RX ORDER — ACETAMINOPHEN 500 MG
650 TABLET ORAL EVERY 6 HOURS
Refills: 0 | Status: DISCONTINUED | OUTPATIENT
Start: 2020-07-13 | End: 2020-07-14

## 2020-07-13 RX ADMIN — Medication 250 MILLIGRAM(S): at 05:20

## 2020-07-13 RX ADMIN — Medication 650 MILLIGRAM(S): at 14:49

## 2020-07-13 RX ADMIN — Medication 250 MILLIGRAM(S): at 17:04

## 2020-07-13 NOTE — PROGRESS NOTE ADULT - ASSESSMENT
55 y/o F w/ PMH HTN and arthritis who presented two weeks ago with sepsis 2/2 purulent cellulitis sent home on bactrim who now presents with multiorgan dysfunction, metabolic acidosis, and hypotension most likely due to Bactrim toxicity.

## 2020-07-13 NOTE — PROGRESS NOTE ADULT - SUBJECTIVE AND OBJECTIVE BOX
Interval Events:   No abdominal pain  No nausea /vomiting     Hospital Medications:  lactated ringers. 1000 milliLiter(s) IV Continuous <Continuous>  silver sulfADIAZINE 1% Cream 1 Application(s) Topical two times a day  vancomycin  IVPB 1000 milliGRAM(s) IV Intermittent every 12 hours  vancomycin  IVPB            ROS:   General:  No fevers, chills or night sweats  ENT:  No sore throat or dysphagia  CV:  No pain or palpitations  Resp:  No dyspnea, cough or  wheezing  GI:  as above  Skin:  No rash or edema  Neuro: no weakness   Hematologic: no bleeding  Musculoskeletal: no muscle pain or join pain  Psych: no agitation      PHYSICAL EXAM:   Vital Signs:  Vital Signs Last 24 Hrs  T(C): 37.1 (13 Jul 2020 05:00), Max: 37.8 (12 Jul 2020 17:00)  T(F): 98.7 (13 Jul 2020 05:00), Max: 100 (12 Jul 2020 17:00)  HR: 82 (13 Jul 2020 05:00) (60 - 99)  BP: 114/82 (13 Jul 2020 05:00) (114/82 - 145/71)  BP(mean): --  RR: 18 (13 Jul 2020 05:00) (17 - 18)  SpO2: 100% (13 Jul 2020 05:00) (99% - 100%)  Daily     Daily     GENERAL:  NAD, Appears stated age  HEENT:  NC/AT,  conjunctivae clear and pink, sclera -anicteric  CHEST:  Normal Effort, Breath sounds clear  HEART:  RRR, S1 + S2,   ABDOMEN:  Soft, non-tender, non-distended, normoactive bowel sounds,  no masses  EXTREMITIES:  no cyanosis or edema  SKIN:  Warm & Dry. No rash or erythema  NEURO:  Alert, oriented    LABS:                        10.4   2.18  )-----------( 90       ( 12 Jul 2020 06:35 )             29.7       07-12    128<L>  |  98  |  10  ----------------------------<  110<H>  4.2   |  19<L>  |  0.84    Ca    7.6<L>      12 Jul 2020 06:35  Phos  2.0     07-12  Mg     1.4     07-12    TPro  5.0<L>  /  Alb  2.5<L>  /  TBili  < 0.2<L>  /  DBili  x   /  AST  432<H>  /  ALT  377<H>  /  AlkPhos  96  07-12    LIVER FUNCTIONS - ( 12 Jul 2020 06:35 )  Alb: 2.5 g/dL / Pro: 5.0 g/dL / ALK PHOS: 96 u/L / ALT: 377 u/L / AST: 432 u/L / GGT: x                                       10.4   2.18  )-----------( 90       ( 12 Jul 2020 06:35 )             29.7                         10.1   2.58  )-----------( 75       ( 11 Jul 2020 05:00 )             30.4                         11.4   5.18  )-----------( 80       ( 10 Jul 2020 10:03 )             34.3       Imaging:

## 2020-07-13 NOTE — PROGRESS NOTE ADULT - ATTENDING COMMENTS
pt with sepsis due to finger cellulitis treated with bactrim leading to anahy and transaminitis due to DILI.  appreciate renal and hep followup - likely due to bactrim use, abnl labs improving.  continue with vanco for now as per ID - check trough prior to next dose.  appreciate plastics followup.  Covid IgG abs noted - pt is not actively covid positive, no need for isolation or other precautions.

## 2020-07-13 NOTE — PROGRESS NOTE ADULT - SUBJECTIVE AND OBJECTIVE BOX
Follow Up:      Inverval History/ROS: Patient is a 54y old  Female who presents with a chief complaint of fever (2020 06:38)    No fever  Rash improved  tolerating po      Allergies    Bactrim (Nephrotoxicity; Rash; Hepatotoxicity)    Intolerances        ANTIMICROBIALS:  vancomycin  IVPB 1000 every 12 hours  vancomycin  IVPB        OTHER MEDS:  acetaminophen   Tablet .. 650 milliGRAM(s) Oral every 6 hours PRN  silver sulfADIAZINE 1% Cream 1 Application(s) Topical two times a day      Vital Signs Last 24 Hrs  T(C): 37.2 (2020 13:00), Max: 37.8 (2020 17:00)  T(F): 99 (2020 13:00), Max: 100 (2020 17:00)  HR: 89 (:) (82 - 99)  BP: 118/92 (:) (114/82 - 127/89)  BP(mean): --  RR: 18 (:) (18 - 18)  SpO2: 100% (:) (100% - 100%)    PHYSICAL EXAM:  General: [x ] non-toxic  HEAD/EYES: [ ] PERRL [x ] white sclera [ ] icterus  ENT:  [ ] normal [ ] supple [ ] thrush [ ] pharyngeal exudate  Cardiovascular:   [ ] murmur [ x] normal [ ] PPM/AICD  Respiratory:  [ x] clear to ausculation bilaterally  GI:  [ x] soft, non-tender, normal bowel sounds  :  [ ] story [x ] no CVA tenderness   Musculoskeletal:  [ ] no synovitis  Neurologic:  [ ] non-focal exam   Skin:  [x ] rash improved  Lymph: [ ] no lymphadenopathy  Psychiatric:  [ ] appropriate affect [ x] alert & oriented  Lines:  [ x] no phlebitis [ ] central line                                10.1   2.51  )-----------( 101      ( 2020 06:00 )             30.2       07-13    129<L>  |  98  |  5<L>  ----------------------------<  171<H>  3.6   |  22  |  0.63    Ca    7.8<L>      2020 06:00  Phos  2.0     07-12  Mg     1.4     07-12    TPro  5.3<L>  /  Alb  2.6<L>  /  TBili  < 0.2<L>  /  DBili  x   /  AST  287<H>  /  ALT  333<H>  /  AlkPhos  91        Urinalysis Basic - ( 2020 14:25 )    Color: LIGHT YELLOW / Appearance: CLEAR / S.011 / pH: 7.0  Gluc: NEGATIVE / Ketone: NEGATIVE  / Bili: NEGATIVE / Urobili: NORMAL   Blood: NEGATIVE / Protein: 10 / Nitrite: NEGATIVE   Leuk Esterase: NEGATIVE / RBC: x / WBC x   Sq Epi: x / Non Sq Epi: x / Bacteria: x        MICROBIOLOGY:Culture Results:   No growth to date. (07-10-20 @ 14:16)  Culture Results:   No growth to date. (07-10-20 @ 14:16)  Culture Results:   <10,000 CFU/mL Normal Urogenital Anjali (07-10-20 @ 13:55)      RADIOLOGY:

## 2020-07-13 NOTE — PROGRESS NOTE ADULT - ASSESSMENT
53 y/o F PMHx HTN, recent right hand 1st digit abscess s/p I&D on 06/26/20 admitted for fevers x three day, elevated liver enzymes, SUE and metabolic acidosis.   Patient was recently hospitalized June 26-30 for sepsis 2/2 right hand 1st digit abscess s/p I&D on 06/26/20  She was discharged home on Bactrim on 06/30. ( Wound culture grew MRSA)  About 7-9 days later she started having fevers, itching, rash on her upper chest and abdominal discomfort.    Hepatology consulted for elevated liver enzymes.  Patient with normal liver enzymes prior to this admission ( 06/2020)   On admission (7/10/20), pt found to have elevated AST up to 500 and 370s of ALT. Platelet: 80k,    Otherwise normal INR, ALP and T. Bili       impression:  # Elevated liver enzymes- R factor 11.2 suggestive of hepatocellular injury, likely due to DILI from Bactrim ( Possible DRESS) . DDx: sepsis, viral infection (elevated COVID-19 Ab), AIH, cholecystitis   # Pancytopenia, fever, SUE, rash likely represent hypersensitivity reaction to bactrim. ( RegiSCAR score is 2 = possible DRESS) No eosinophilia   # Right hand 1st digit abscess s/p I&D   # Elevated COVID-19 Ab    Recommendations:  - Daily CMP and INR  - obtain abdominal US to evaluate GB, liver and biliary tree  - obtain HBsAb, HBsAg, HBcAb, HCV Ab, HAV IgG/IgM to r/o viral hepatitides   - obtain RICHIE, ASMA, LKM Ab, quant IgM, IgA, IgG, AMA to r/o autoimmune disease  - Supportive care as per primary team  - Hold off prednisone / immunodepression for possible DRESS- given she still has active infection and she is recovering      Lisette Payne   Gastroenterology Fellow  Pager: [phone]  Please page GI (Pager: [phone]) or if there are any additional questions or concerns.   Please call on-call GI fellow after 5pm and before 8am, and on weekends. 53 y/o F PMHx HTN, recent right hand 1st digit abscess s/p I&D on 06/26/20 admitted for fevers x three day, elevated liver enzymes, SUE and metabolic acidosis.   Patient was recently hospitalized June 26-30 for sepsis 2/2 right hand 1st digit abscess s/p I&D on 06/26/20  She was discharged home on Bactrim on 06/30. ( Wound culture grew MRSA)  About 7-9 days later she started having fevers, itching, rash on her upper chest and abdominal discomfort.    Hepatology consulted for elevated liver enzymes.  Patient with normal liver enzymes prior to this admission ( 06/2020)   On admission (7/10/20), pt found to have elevated AST up to 500 and 370s of ALT. Platelet: 80k,    Otherwise normal INR, ALP and T. Bili       impression:  # Elevated liver enzymes- R factor 11.2 suggestive of hepatocellular injury, likely due to DILI from Bactrim. Less likely other cause, e.g. acute viral hepatitis, sepsis, viral infection (elevated COVID-19 Ab), AIH, cholecystitis   # Pancytopenia, fever, SUE, rash likely represent hypersensitivity reaction to bactrim. ( RegiSCAR score is 2 = possible DRESS) No eosinophilia   # Right hand 1st digit abscess s/p I&D   # Elevated COVID-19 Ab    Recommendations:  - f/u CMP  - obtain abdominal US to evaluate GB, liver and biliary tree  - obtain HBsAb, HBsAg, HBcAb, HCV Ab, HAV IgM to r/o viral hepatitides   - obtain RICHIE, ASMA, IgG        Lisette Payne   Gastroenterology Fellow  Pager: [phone]  Please page GI (Pager: [phone]) or if there are any additional questions or concerns.   Please call on-call GI fellow after 5pm and before 8am, and on weekends.

## 2020-07-13 NOTE — PROGRESS NOTE ADULT - ASSESSMENT
54 year old with recent left hand MRSA abscess presents with fever.    1) Left hand abscess/ cellulitis  Prior cx with MRSA    Currently, I do not see a residual abscess  Still has open wound    Tx with abx for 7 more days  vancomycin for cellulitis- while admitted  When stable for discharge, can finish course with doxycyline 100 mg po q 12      Suspicion for underlying OM is low- but if not improving would need repeat MRI    2)Fever/ rash/ transaminitis/ SUE/ cytopenia-    transaminitis improved. Platelets are increasing. Monitor persistent leukopenia      ? due to bactrim. Pt advised to consider herself sulfa allergic    Monitor.

## 2020-07-13 NOTE — PROGRESS NOTE ADULT - PROBLEM SELECTOR PLAN 1
P/w hypotension, bicarb 14, VBG showing pH of 7.22  - previous admission with right hand cellulitis, wound cultures grew MRSA and was treated with vanc/zosyn with last dose on 6/30, sent home on 14 day course of DS 160mg/800mg TMP/SMX PO bid, MRI of the hand on 6/30 showed mild early sign of osteomyelitis.   metabolic acidosis due to renal failure could be due to bactrim  - In ED pt got 2.1 L of NS and 1g vanc and zosyn  - S/p bicarb drip 150meq/L at 75cc/hr --> improved, appreciate renal eval, transition to LR  - Morning BMP and VBG  - Plan as below for infection   - plan as below for SUE

## 2020-07-14 ENCOUNTER — TRANSCRIPTION ENCOUNTER (OUTPATIENT)
Age: 55
End: 2020-07-14

## 2020-07-14 VITALS
RESPIRATION RATE: 18 BRPM | SYSTOLIC BLOOD PRESSURE: 119 MMHG | OXYGEN SATURATION: 98 % | HEART RATE: 79 BPM | TEMPERATURE: 98 F | DIASTOLIC BLOOD PRESSURE: 81 MMHG

## 2020-07-14 DIAGNOSIS — R10.13 EPIGASTRIC PAIN: ICD-10-CM

## 2020-07-14 LAB
ALBUMIN SERPL ELPH-MCNC: 2.7 G/DL — LOW (ref 3.3–5)
ALP SERPL-CCNC: 86 U/L — SIGNIFICANT CHANGE UP (ref 40–120)
ALT FLD-CCNC: 288 U/L — HIGH (ref 4–33)
ANION GAP SERPL CALC-SCNC: 10 MMO/L — SIGNIFICANT CHANGE UP (ref 7–14)
AST SERPL-CCNC: 172 U/L — HIGH (ref 4–32)
BILIRUB SERPL-MCNC: < 0.2 MG/DL — LOW (ref 0.2–1.2)
BUN SERPL-MCNC: 6 MG/DL — LOW (ref 7–23)
CALCIUM SERPL-MCNC: 7.9 MG/DL — LOW (ref 8.4–10.5)
CHLORIDE SERPL-SCNC: 99 MMOL/L — SIGNIFICANT CHANGE UP (ref 98–107)
CO2 SERPL-SCNC: 23 MMOL/L — SIGNIFICANT CHANGE UP (ref 22–31)
CREAT SERPL-MCNC: 0.72 MG/DL — SIGNIFICANT CHANGE UP (ref 0.5–1.3)
GLUCOSE SERPL-MCNC: 174 MG/DL — HIGH (ref 70–99)
HCT VFR BLD CALC: 29.4 % — LOW (ref 34.5–45)
HGB BLD-MCNC: 9.9 G/DL — LOW (ref 11.5–15.5)
MAGNESIUM SERPL-MCNC: 1.7 MG/DL — SIGNIFICANT CHANGE UP (ref 1.6–2.6)
MCHC RBC-ENTMCNC: 29.9 PG — SIGNIFICANT CHANGE UP (ref 27–34)
MCHC RBC-ENTMCNC: 33.7 % — SIGNIFICANT CHANGE UP (ref 32–36)
MCV RBC AUTO: 88.8 FL — SIGNIFICANT CHANGE UP (ref 80–100)
NRBC # FLD: 0 K/UL — SIGNIFICANT CHANGE UP (ref 0–0)
PHOSPHATE SERPL-MCNC: 2.1 MG/DL — LOW (ref 2.5–4.5)
PLATELET # BLD AUTO: 127 K/UL — LOW (ref 150–400)
PMV BLD: 10.2 FL — SIGNIFICANT CHANGE UP (ref 7–13)
POTASSIUM SERPL-MCNC: 3.6 MMOL/L — SIGNIFICANT CHANGE UP (ref 3.5–5.3)
POTASSIUM SERPL-SCNC: 3.6 MMOL/L — SIGNIFICANT CHANGE UP (ref 3.5–5.3)
PROT SERPL-MCNC: 5.4 G/DL — LOW (ref 6–8.3)
RBC # BLD: 3.31 M/UL — LOW (ref 3.8–5.2)
RBC # FLD: 15.1 % — HIGH (ref 10.3–14.5)
SODIUM SERPL-SCNC: 132 MMOL/L — LOW (ref 135–145)
WBC # BLD: 4.16 K/UL — SIGNIFICANT CHANGE UP (ref 3.8–10.5)
WBC # FLD AUTO: 4.16 K/UL — SIGNIFICANT CHANGE UP (ref 3.8–10.5)

## 2020-07-14 PROCEDURE — 99232 SBSQ HOSP IP/OBS MODERATE 35: CPT

## 2020-07-14 PROCEDURE — 99233 SBSQ HOSP IP/OBS HIGH 50: CPT

## 2020-07-14 RX ORDER — RAMIPRIL 5 MG
0 CAPSULE ORAL
Qty: 0 | Refills: 0 | DISCHARGE

## 2020-07-14 RX ORDER — ACETAMINOPHEN 500 MG
2 TABLET ORAL
Qty: 0 | Refills: 0 | DISCHARGE
Start: 2020-07-14

## 2020-07-14 RX ORDER — SODIUM,POTASSIUM PHOSPHATES 278-250MG
1 POWDER IN PACKET (EA) ORAL
Refills: 0 | Status: DISCONTINUED | OUTPATIENT
Start: 2020-07-14 | End: 2020-07-14

## 2020-07-14 RX ADMIN — Medication 100 MILLIGRAM(S): at 15:48

## 2020-07-14 RX ADMIN — Medication 30 MILLILITER(S): at 10:29

## 2020-07-14 RX ADMIN — Medication 1 TABLET(S): at 15:10

## 2020-07-14 RX ADMIN — Medication 250 MILLIGRAM(S): at 05:13

## 2020-07-14 NOTE — CHART NOTE - NSCHARTNOTEFT_GEN_A_CORE
Hepatology Consult Note    Hepatology initially consulted for abnormal liver function tests, likely DILI from bactrim. Labs continue to improve with bactrim cessation. Can stop trending LFT's. Hepatology will sign off, please call with any questions.     Brandt Meneses, PGY4  Gastroenterology Fellow  Available on [employer] Teams  [phone] ([employer] Short Range Pager)  [phone] (Long Range Pager)    After 5pm, please contact the on-call GI fellow. [phone] Hepatology Consult Note    Hepatology initially consulted for abnormal liver function tests, likely DILI from bactrim. Labs continue to improve with bactrim cessation.   Please follow LFTs and call with any questions, if they don't continue to improve. If they don't normalize by the day of discharge, they need to be monitored by the PCP or the patient should see us in the office.     Brandt Meneses, PGY4  Gastroenterology Fellow  Available on [employer] Teams  [phone] ([employer] Short Range Pager)  [phone] (Long Range Pager)    After 5pm, please contact the on-call GI fellow. [phone]

## 2020-07-14 NOTE — PROGRESS NOTE ADULT - ATTENDING COMMENTS
transition to oral antibiotics - outpt followup.  needs to follow with pcp for rpt lfts on friday.  discharge planning and coordination ~ 45mins.

## 2020-07-14 NOTE — PROGRESS NOTE ADULT - PROBLEM SELECTOR PLAN 6
cause of previous admission two weeks ago as described above  - appreciate Dr. Connor's input, R thumb clinically improved, WC as recommended  - BCx neg to date, continue to f/u  - cont vancomycin   - appreciate ID and Plastic recs cause of previous admission two weeks ago as described above  - appreciate Dr. Connor's input, R thumb clinically improved, WC as recommended  - BCx neg to date, continue to f/u  - vanc trough on 4th dose was 14.3, can increase dose to 1250mg bid due to improving kidney function.   - ID rec to continue abx for 7 more days, vanc while in hospital and then doxycycline 100mg PO q12 when discharged.   - appreciate ID and Plastic recs Plts on admission 80, her baseline is 250's, possible DITP 2/2 bactrim use, pt did not receive heparin on last admission, therefore no suspicion for HIT, plts now up to 101. Improving   - hold heparin  - continue to monitor with CBC

## 2020-07-14 NOTE — PROGRESS NOTE ADULT - PROBLEM SELECTOR PLAN 1
P/w hypotension, bicarb 14, VBG showing pH of 7.22  - previous admission with right hand cellulitis, wound cultures grew MRSA and was treated with vanc/zosyn with last dose on 6/30, sent home on 14 day course of DS 160mg/800mg TMP/SMX PO bid, MRI of the hand on 6/30 showed mild early sign of osteomyelitis. Metabolic acidosis due to renal failure could be due to bactrim. In ED pt got 2.1 L of NS and 1g vanc and zosyn-- resolving   - S/p bicarb drip 150meq/L at 75cc/hr --> improved, appreciate renal eval  - Plan as below for infection   - plan as below for SUE pt c/o of 7/10 pain in center of epigastrium, reproducible with palpation, not concerned for acute cardiac issue.   - start Maalox   - cont with hot packs   - cont with tylenol PRN for pain

## 2020-07-14 NOTE — DISCHARGE NOTE PROVIDER - NSDCMRMEDTOKEN_GEN_ALL_CORE_FT
gabapentin 300 mg oral capsule: 1 cap(s) orally 3 times a day  meloxicam 15 mg oral tablet: 1 tab(s) orally once a day  ramipril 5 mg oral tablet:   sulfamethoxazole-trimethoprim 800 mg-160 mg oral tablet: 2 tab(s) orally 2 times a day until finished acetaminophen 325 mg oral tablet: 2 tab(s) orally every 6 hours, As needed, Mild Pain (1 - 3), Moderate Pain (4 - 6)  gabapentin 300 mg oral capsule: 1 cap(s) orally 3 times a day  meloxicam 15 mg oral tablet: 1 tab(s) orally once a day  ramipril 5 mg oral tablet: acetaminophen 325 mg oral tablet: 2 tab(s) orally every 6 hours, As needed, Mild Pain (1 - 3), Moderate Pain (4 - 6)  doxycycline hyclate 100 mg oral tablet: 1 tab(s) orally 2 times a day   gabapentin 300 mg oral capsule: 1 cap(s) orally 3 times a day  meloxicam 15 mg oral tablet: 1 tab(s) orally once a day  silver sulfADIAZINE 1% topical cream: Apply topically to affected area 2 times a day

## 2020-07-14 NOTE — PROGRESS NOTE ADULT - SUBJECTIVE AND OBJECTIVE BOX
Follow Up:      Inverval History/ROS:Patient is a 54y old  Female who presents with a chief complaint of fever (2020 13:54)    Espinosa resolved  no fever    Allergies    Bactrim (Nephrotoxicity; Rash; Hepatotoxicity)    Intolerances        ANTIMICROBIALS:  doxycycline hyclate Capsule 100 once  vancomycin  IVPB 1000 every 12 hours  vancomycin  IVPB        OTHER MEDS:  acetaminophen   Tablet .. 650 milliGRAM(s) Oral every 6 hours PRN  potassium phosphate / sodium phosphate Tablet (K-PHOS No. 2) 1 Tablet(s) Oral three times a day before meals  silver sulfADIAZINE 1% Cream 1 Application(s) Topical two times a day      Vital Signs Last 24 Hrs  T(C): 36.7 (2020 12:00), Max: 37.1 (2020 17:00)  T(F): 98.1 (2020 12:00), Max: 98.7 (2020 17:00)  HR: 79 (2020 12:00) (79 - 131)  BP: 119/81 (2020 12:00) (110/86 - 132/87)  BP(mean): --  RR: 18 (2020 12:00) (18 - 18)  SpO2: 98% (2020 12:00) (98% - 100%)    PHYSICAL EXAM:  General: [ x] non-toxic  HEAD/EYES: [ ] PERRL [ x] white sclera [ ] icterus  ENT:  [ ] normal [ x] supple [ ] thrush [ ] pharyngeal exudate  Cardiovascular:   [ ] murmur [x ] normal [ ] PPM/AICD  Respiratory:  [ ] clear to ausculation bilaterally  GI:  [ x] soft, non-tender, normal bowel sounds  :  [ ] story [x ] no CVA tenderness   Musculoskeletal:  [ x] thumb ulcerated wound  Neurologic:  [ ] non-focal exam   Skin:  [x ]  rash improved  Lymph: [ ] no lymphadenopathy  Psychiatric:  [ ] appropriate affect [ x] alert & oriented  Lines:  [x ] no phlebitis [ ] central line                                9.9    4.16  )-----------( 127      ( 2020 06:00 )             29.4       07-14    132<L>  |  99  |  6<L>  ----------------------------<  174<H>  3.6   |  23  |  0.72    Ca    7.9<L>      2020 06:00  Phos  2.1       Mg     1.7         TPro  5.4<L>  /  Alb  2.7<L>  /  TBili  < 0.2<L>  /  DBili  x   /  AST  172<H>  /  ALT  288<H>  /  AlkPhos  86        Urinalysis Basic - ( 2020 14:25 )    Color: LIGHT YELLOW / Appearance: CLEAR / S.011 / pH: 7.0  Gluc: NEGATIVE / Ketone: NEGATIVE  / Bili: NEGATIVE / Urobili: NORMAL   Blood: NEGATIVE / Protein: 10 / Nitrite: NEGATIVE   Leuk Esterase: NEGATIVE / RBC: x / WBC x   Sq Epi: x / Non Sq Epi: x / Bacteria: x        MICROBIOLOGY:Culture Results:   No growth to date. (07-10-20 @ 14:16)  Culture Results:   No growth to date. (07-10-20 @ 14:16)  Culture Results:   <10,000 CFU/mL Normal Urogenital Anjali (07-10-20 @ 13:55)      RADIOLOGY:

## 2020-07-14 NOTE — PROGRESS NOTE ADULT - ASSESSMENT
53 y/o F w/ PMH HTN and arthritis who presented two weeks ago with sepsis 2/2 purulent cellulitis sent home on bactrim who presents with multiorgan dysfunction, metabolic acidosis, and hypotension most likely due to Bactrim toxicity currently improving

## 2020-07-14 NOTE — PROGRESS NOTE ADULT - PROBLEM SELECTOR PLAN 2
BP's 80s/50s in setting of metabolic acidosis, unclear if this is related to infxn vs drug reaction. Improvement  - 's/80's in last 24 hours P/w hypotension, bicarb 14, VBG showing pH of 7.22  - previous admission with right hand cellulitis, wound cultures grew MRSA and was treated with vanc/zosyn with last dose on 6/30, sent home on 14 day course of DS 160mg/800mg TMP/SMX PO bid, MRI of the hand on 6/30 showed mild early sign of osteomyelitis. Metabolic acidosis due to renal failure could be due to bactrim. In ED pt got 2.1 L of NS and 1g vanc and zosyn-- resolving   - S/p bicarb drip 150meq/L at 75cc/hr --> improved, appreciate renal eval  - Plan as below for infection   - plan as below for SUE

## 2020-07-14 NOTE — DISCHARGE NOTE PROVIDER - NSDCCPCAREPLAN_GEN_ALL_CORE_FT
PRINCIPAL DISCHARGE DIAGNOSIS  Diagnosis: Medication side effect  Assessment and Plan of Treatment: You came in with low blood pressure and fever and were found to have metabolic acidosis and multiple organ failure. You received fluids and antibiotics in the ED and then were put on a bicarb drip. Your bactrim was held and you started showing clinical improvement. Your AST/ALT went from 502/372 to 172/288. Your creatinine went from 2.48 to 0.72. Your platelets went from 80 to 101. You were started on vancomycin for your hand cellulitis and your blood cultures have been negative to date. Your metabolic acidosis improved with most recent venous blood gas ph of 7.47 up from 7.22. The rash on neck has also cleared up and you have not had a fever in over 48 hours. Please follow up with your primary care doctor in two weeks. You will be sent home on a 7 day course of doxycycline.      SECONDARY DISCHARGE DIAGNOSES  Diagnosis: Cellulitis  Assessment and Plan of Treatment: PRINCIPAL DISCHARGE DIAGNOSIS  Diagnosis: Medication side effect  Assessment and Plan of Treatment: You came in with low blood pressure and fever and were found to have metabolic acidosis and multiple organ failure including DITP, SUE and transaminitis. You received fluids and antibiotics in the ED and then were put on a bicarb drip. Your bactrim was held and you started showing clinical improvement. Your AST/ALT went from 502/372 to 172/288. Your creatinine went from 2.48 to 0.72. Your platelets went from 80 to 101. You were started on vancomycin for your hand cellulitis and your blood cultures have been negative to date. Your metabolic acidosis improved with most recent venous blood gas ph of 7.47 up from 7.22. The rash on your neck has also cleared up and you have not had a fever in over 48 hours. Please follow up with your primary care doctor in one to two weeks. You will be sent home on a 7 day course of doxycycline for your hand cellulitis.      SECONDARY DISCHARGE DIAGNOSES  Diagnosis: Cellulitis  Assessment and Plan of Treatment: PRINCIPAL DISCHARGE DIAGNOSIS  Diagnosis: Medication side effect  Assessment and Plan of Treatment: You came in with low blood pressure and fever and were found to have metabolic acidosis and multiple organ failure including DITP (low platelets), SUE (kidney injury) and transaminitis (liver damage). You received fluids and antibiotics in the ED and then were put on a bicarb drip. Your bactrim was held and you started showing clinical improvement. Your liver enzymes AST/ALT went from 502/372 to 172/288at discharge. Your creatinine went from 2.48 to 0.72. Your platelets went from 80 to 101. You were started on vancomycin for your hand cellulitis and your blood cultures have been negative to date. Your metabolic acidosis improved with most recent venous blood gas ph of 7.47 up from 7.22. The rash on your neck has also cleared up and you have not had a fever in over 48 hours. Please follow up with your primary care doctor by Friday of this week. You will be sent home on a 5 day course of doxycycline to start tomorrow 7/15 100 mg twice daily, for your hand cellulitis.      SECONDARY DISCHARGE DIAGNOSES  Diagnosis: Hypertension  Assessment and Plan of Treatment: You came into the hospital with low blood pressure and kidney injury so we held yoru ramapril. You should not take the ramapril until you see your primary care doctor by the end of this week to see if your blood pressure is high enough to take it.    Diagnosis: Drug allergy, antibiotic  Assessment and Plan of Treatment: You were admitted for adverse effects to the medication bactrim. never take this medication again. Advise your primary care doctor that you are allergic to this medication. Your reaction inculdes liver injury, kidney injury, rash, and low platelet count.    Diagnosis: Cellulitis  Assessment and Plan of Treatment:

## 2020-07-14 NOTE — PROGRESS NOTE ADULT - SUBJECTIVE AND OBJECTIVE BOX
PROGRESS NOTE:   Authored by Dr. Alvina Cruz, DONNELL pager [phone]    Patient is a 54y old  Female who presents with a chief complaint of fever (2020 08:52)      SUBJECTIVE / OVERNIGHT EVENTS: Pt feels much better today and is asking to go home. She states that she is able to tolerate a PO diet w/o n/v/d/c. Pt cont to c/o central epigastric pain that is reproducible when palpating. She says that it is about a 7/10 and tylenol has been helping. Pt has been able to ambulate on her own and denies dizziness. She has not experienced fever or chills in the past 48 hours. She states that he finger is feeling better as well. She thinks that the swelling has decreased and it is not hurting as much as yesterday. Pt no longer is c/o burning on urination and does not have red blood on the toilet paper when wiping after BMs.    REVIEW OF SYSTEMS:    CONSTITUTIONAL: No weakness, fevers or chills  EYES/ENT: No visual changes;  No vertigo or throat pain   NECK: No pain or stiffness  BACK: no pain or lesions   RESPIRATORY: No cough, wheezing, hemoptysis; No shortness of breath  CARDIOVASCULAR: No chest pain or palpitations  GASTROINTESTINAL: reports 7/10 epigastric pain. No nausea, vomiting, or hematemesis; No diarrhea or constipation. No melena or hematochezia.  GENITOURINARY: No dysuria, frequency or hematuria  NEUROLOGICAL: No numbness or weakness  EXTREMITIES: no varicose veins, no pain in UE and LE, finger feels much better, decrease swelling   SKIN: No itching, rashes      MEDICATIONS  (STANDING):  aluminum hydroxide/magnesium hydroxide/simethicone Suspension 30 milliLiter(s) Oral once  potassium phosphate / sodium phosphate Tablet (K-PHOS No. 2) 1 Tablet(s) Oral three times a day before meals  silver sulfADIAZINE 1% Cream 1 Application(s) Topical two times a day  vancomycin  IVPB 1000 milliGRAM(s) IV Intermittent every 12 hours  vancomycin  IVPB        MEDICATIONS  (PRN):  acetaminophen   Tablet .. 650 milliGRAM(s) Oral every 6 hours PRN Mild Pain (1 - 3), Moderate Pain (4 - 6)      CAPILLARY BLOOD GLUCOSE        I&O's Summary    2020 07:01  -  2020 07:00  --------------------------------------------------------  IN: 500 mL / OUT: 0 mL / NET: 500 mL        PHYSICAL EXAM:  Vital Signs Last 24 Hrs  T(C): 36.8 (2020 08:00), Max: 37.2 (2020 13:00)  T(F): 98.2 (2020 08:00), Max: 99 (2020 13:00)  HR: 85 (2020 08:00) (82 - 131)  BP: 120/84 (2020 08:00) (110/86 - 132/87)  BP(mean): --  RR: 18 (2020 08:00) (18 - 18)  SpO2: 100% (2020 08:00) (99% - 100%)    GENERAL: No acute distress, well-developed  HEAD:  Atraumatic, Normocephalic  EYES: EOMI, PERRLA, conjunctiva and sclera clear  NECK: Supple, no lymphadenopathy, no JVD  CHEST/LUNG: CTAB; No wheezes, rales, or rhonchi  HEART: Regular rate and rhythm; No murmurs, rubs, or gallops  ABDOMEN: Soft, non-tender, non-distended; normal bowel sounds, no organomegaly  EXTREMITIES:  2+ peripheral pulses b/l, No clubbing, cyanosis, or edema  NEUROLOGY: A&O x 3, no focal deficits  SKIN: No rashes or lesions    LABS:                        9.9    4.16  )-----------( 127      ( 2020 06:00 )             29.4     07-14    132<L>  |  99  |  6<L>  ----------------------------<  174<H>  3.6   |  23  |  0.72    Ca    7.9<L>      2020 06:00  Phos  2.1     07-14  Mg     1.7     07-14    TPro  5.4<L>  /  Alb  2.7<L>  /  TBili  < 0.2<L>  /  DBili  x   /  AST  172<H>  /  ALT  288<H>  /  AlkPhos  86  07-14          Urinalysis Basic - ( 2020 14:25 )    Color: LIGHT YELLOW / Appearance: CLEAR / S.011 / pH: 7.0  Gluc: NEGATIVE / Ketone: NEGATIVE  / Bili: NEGATIVE / Urobili: NORMAL   Blood: NEGATIVE / Protein: 10 / Nitrite: NEGATIVE   Leuk Esterase: NEGATIVE / RBC: x / WBC x   Sq Epi: x / Non Sq Epi: x / Bacteria: x          RADIOLOGY & ADDITIONAL TESTS:  Results Reviewed:   Imaging Personally Reviewed:  Electrocardiogram Personally Reviewed:    COORDINATION OF CARE:  Care Discussed with Consultants/Other Providers [Y/N]:  Prior or Outpatient Records Reviewed [Y/N]:

## 2020-07-14 NOTE — PROGRESS NOTE ADULT - PROBLEM SELECTOR PLAN 3
Pt p/w Cr 2.48 (baseline is 0.75) with metabolic acidosis likely due to bactrim use vs infxn - improving with IVFs  - Cr .72 today, back at baseline   - avoid nephrotoxic agents and renally dose meds  - encourage PO intake/ fluids  -monitor serum Na  - appreciate nephrology recs BP's 80s/50s in setting of metabolic acidosis, unclear if this is related to infxn vs drug reaction. Improvement  - 's/80's in last 24 hours

## 2020-07-14 NOTE — PROGRESS NOTE ADULT - ASSESSMENT
54 year old with recent left hand MRSA abscess presents with fever.    1) Left hand abscess/ cellulitis  Prior cx with MRSA    Currently, I do not see a residual abscess  Still has open wound    Tx with abx for 6 more days  vancomycin for cellulitis- while admitted  When stable for discharge, can finish course with doxycyline 100 mg po q 12    Suspicion for underlying OM is low- but if not improving would need repeat MRI  Outpt follow up with surgery      2)Fever/ rash/ transaminitis/ SUE/ cytopenia-    transaminitis improved. Platelets are increasing. Monitor persistent leukopenia      ? due to bactrim. Pt advised to consider herself sulfa allergic    Monitor.    Will sign off

## 2020-07-14 NOTE — DISCHARGE NOTE PROVIDER - CARE PROVIDER_API CALL
RENEA PORRAS [place]  Internal Medicine  [address] [place], [place] [address]  Phone: [phone]  Fax: [phone]  Follow Up Time: 1 week

## 2020-07-14 NOTE — PROGRESS NOTE ADULT - PROVIDER SPECIALTY LIST ADULT
Hepatology
Infectious Disease
Infectious Disease
Internal Medicine
Infectious Disease
Internal Medicine

## 2020-07-14 NOTE — DISCHARGE NOTE NURSING/CASE MANAGEMENT/SOCIAL WORK - PATIENT PORTAL LINK FT
You can access the FollowMyHealth Patient Portal offered by [facility] by registering at the following website: [email or web address]. By joining [facility]’s FollowMyHealth portal, you will also be able to view your health information using other applications (apps) compatible with our system.

## 2020-07-14 NOTE — DISCHARGE NOTE PROVIDER - HOSPITAL COURSE
55 y/o F w/ PMH HTN and arthritis who presented two weeks ago with sepsis 2/2 purulent cellulitis sent home on bactrim who presents with multiorgan dysfunction, metabolic acidosis, and hypotension most likely due to Bactrim toxicity. Pt received 2.1L of NS and 1g of vanc and zosyn in the ED. She was then put on a bicarb drip 150meq/L at 75cc/hr. Bactrim was held. Pt started showing improvement. Her AST/ALT went from 502/372 to 172/288. Her creatinine went from 2.48 to 0.72. Her Platelets went from 80 to 101. She was started on vancomycin for her hand cellulitis with BCx negative to date. Metabolic acidosis improved with most recent vbg ph of 7.47 up from 7.22. Rash on neck has also cleared and pt has not had a fever in over 48 hours.         Pt is medically optimized for discharge 55 y/o F w/ PMH HTN and arthritis who presented two weeks ago with sepsis 2/2 purulent cellulitis sent home on bactrim who presents with multiorgan dysfunction, metabolic acidosis, and hypotension most likely due to Bactrim toxicity. Pt received 2.1L of NS and 1g of vanc and zosyn in the ED. She was then put on a bicarb drip 150meq/L at 75cc/hr. Bactrim was held. Pt started showing improvement. Her AST/ALT went from 502/372 to 172/288. Her creatinine went from 2.48 to 0.72. Her Platelets went from 80 to 101. She was started on vancomycin for her hand cellulitis with BCx negative to date. Metabolic acidosis improved with most recent vbg ph of 7.47 up from 7.22. Rash on neck has also cleared and pt has not had a fever in over 48 hours. At discharge the patient's renal function was at baseline and transaminases were downtrending.         Pt is medically optimized for discharge

## 2020-07-14 NOTE — PROGRESS NOTE ADULT - PROBLEM SELECTOR PLAN 4
,  on admission likely DILI due to Bactrim, now improving   - downtrending AST/ALT  - appreciate Hepatology recs  - Daily CMP and INR Pt p/w Cr 2.48 (baseline is 0.75) with metabolic acidosis likely due to bactrim use vs infxn - improving with IVFs  - Cr .72 today, back at baseline   - avoid nephrotoxic agents and renally dose meds  - encourage PO intake/ fluids  -monitor serum Na  - appreciate nephrology recs

## 2020-07-14 NOTE — PROGRESS NOTE ADULT - PROBLEM SELECTOR PLAN 7
previously on NSAIDs, on hold cause of previous admission two weeks ago as described above  - appreciate Dr. Connor's input, R thumb clinically improved, WC as recommended  - BCx neg to date, continue to f/u  - vanc trough on 4th dose was 14.3, can increase dose to 1250mg bid due to improving kidney function.   - ID rec to continue abx for 7 more days, vanc while in hospital and then doxycycline 100mg PO q12 when discharged.   - appreciate ID and Plastic recs

## 2020-07-14 NOTE — PROGRESS NOTE ADULT - PROBLEM SELECTOR PLAN 5
Plts on admission 80, her baseline is 250's, possible DITP 2/2 bactrim use, pt did not receive heparin on last admission, therefore no suspicion for HIT, plts now up to 101. Improving   - hold heparin  - continue to monitor with CBC ,  on admission likely DILI due to Bactrim, now improving   - downtrending AST/ALT  - appreciate Hepatology recs  - Daily CMP and INR

## 2020-07-15 LAB
CULTURE RESULTS: SIGNIFICANT CHANGE UP
CULTURE RESULTS: SIGNIFICANT CHANGE UP
SPECIMEN SOURCE: SIGNIFICANT CHANGE UP
SPECIMEN SOURCE: SIGNIFICANT CHANGE UP

## 2022-03-16 NOTE — PROGRESS NOTE ADULT - SUBJECTIVE AND OBJECTIVE BOX
POC reviewed with pt:    - AAOx4, VSS, on 2L O2, no SOB  - Tele NSR  - NG to low intermittent suction, NPO  - Midline incision BREE, edges approximated.   - 18G IV inserted to JUDIT.  - BS controlled.  - Pain controlled with PRN medication  - Fluoroscopy scheduled for tomorrow.    Bed locked in lowest position, call bell within reach. All needs met, no complaints at this time. Frequent rounding for safety.   Plastic Surgery Progress Note (pg [facility]: [id number], [facility]: [phone], [facility]: [phone])    SUBJECTIVE:  Doing well. No overnight events.     OBJECTIVE:     ** VITAL SIGNS / I&O's **    Vital Signs Last 24 Hrs  T(C): 36.8 (29 Jun 2020 06:38), Max: 37.1 (28 Jun 2020 19:59)  T(F): 98.3 (29 Jun 2020 06:38), Max: 98.8 (28 Jun 2020 19:59)  HR: 68 (29 Jun 2020 06:38) (68 - 76)  BP: 146/94 (29 Jun 2020 06:38) (128/82 - 146/94)  BP(mean): --  RR: 18 (29 Jun 2020 06:38) (17 - 18)  SpO2: 100% (29 Jun 2020 06:38) (99% - 100%)        ** PHYSICAL EXAM **    -- CONSTITUTIONAL: AOx3. NAD.   -- EXTREMITIES: R thumb w/ improved erythema, still edematous and ecchymotic, first webspace w/ maceration, drains in place       **MEDS**  acetaminophen   Tablet .. 1000 milliGRAM(s) Oral every 6 hours PRN  gabapentin 300 milliGRAM(s) Oral three times a day  ibuprofen  Tablet. 600 milliGRAM(s) Oral every 6 hours PRN  lisinopril 5 milliGRAM(s) Oral daily  polyethylene glycol 3350 17 Gram(s) Oral daily  traMADol 25 milliGRAM(s) Oral every 6 hours PRN  vancomycin  IVPB 1250 milliGRAM(s) IV Intermittent every 12 hours      ** LABS **              CAPILLARY BLOOD GLUCOSE

## 2022-06-07 NOTE — PROGRESS NOTE ADULT - PROBLEM SELECTOR PROBLEM 1
Attempted to contact pt to confirmed 1020 am arrival time. Left message.    Sepsis without acute organ dysfunction, due to unspecified organism

## 2022-07-12 NOTE — PROGRESS NOTE ADULT - SUBJECTIVE AND OBJECTIVE BOX
PROGRESS NOTE:   Authoted by Dr. Quang Ferro MD  Pager [phone] [facility], [id number] [facility]     Patient is a 54y old  Female who presents with a chief complaint of right swollen hand (29 Jun 2020 07:42)      SUBJECTIVE / OVERNIGHT EVENTS: The patient was seen and examined at bedside.     REVIEW OF SYSTEMS:    CONSTITUTIONAL: No weakness, fevers or chills  EYES/ENT: No visual changes;  No vertigo or throat pain   NECK: No pain or stiffness  RESPIRATORY: No cough, wheezing, hemoptysis; No shortness of breath  CARDIOVASCULAR: No chest pain or palpitations  GASTROINTESTINAL: No abdominal or epigastric pain. No nausea, vomiting, or hematemesis; No diarrhea or constipation. No melena or hematochezia.  GENITOURINARY: No dysuria, frequency or hematuria  NEUROLOGICAL: No numbness or weakness  SKIN: No itching, rashes    MEDICATIONS  (STANDING):  gabapentin 300 milliGRAM(s) Oral three times a day  lisinopril 5 milliGRAM(s) Oral daily  polyethylene glycol 3350 17 Gram(s) Oral daily  vancomycin  IVPB 1250 milliGRAM(s) IV Intermittent every 12 hours    MEDICATIONS  (PRN):  acetaminophen   Tablet .. 1000 milliGRAM(s) Oral every 6 hours PRN Moderate Pain (4 - 6)  ibuprofen  Tablet. 600 milliGRAM(s) Oral every 6 hours PRN Severe Pain (7 - 10)  traMADol 25 milliGRAM(s) Oral every 6 hours PRN Severe Pain (7 - 10)      CAPILLARY BLOOD GLUCOSE        I&O's Summary      PHYSICAL EXAM:  Vital Signs Last 24 Hrs  T(C): 36.8 (29 Jun 2020 06:38), Max: 37.1 (28 Jun 2020 19:59)  T(F): 98.3 (29 Jun 2020 06:38), Max: 98.8 (28 Jun 2020 19:59)  HR: 68 (29 Jun 2020 06:38) (68 - 76)  BP: 146/94 (29 Jun 2020 06:38) (128/82 - 146/94)  BP(mean): --  RR: 18 (29 Jun 2020 06:38) (17 - 18)  SpO2: 100% (29 Jun 2020 06:38) (99% - 100%)    CONSTITUTIONAL: NAD, well-developed  RESPIRATORY: Normal respiratory effort; lungs are clear to auscultation bilaterally  CARDIOVASCULAR: Regular rate and rhythm, normal S1 and S2, no murmur/rub/gallop; No lower extremity edema; Peripheral pulses are 2+ bilaterally  ABDOMEN: Nontender to palpation, normoactive bowel sounds, no rebound/guarding; No hepatosplenomegaly  MUSCLOSKELETAL: no clubbing or cyanosis of digits; no joint swelling or tenderness to palpation  PSYCH: A+O to person, place, and time; affect appropriate    LABS:        Culture - Abscess with Gram Stain (collected 26 Jun 2020 20:24)  Source: .Abscess thumb - right  Preliminary Report (28 Jun 2020 19:06):    Few Methicillin resistant Staphylococcus aureus  Organism: Methicillin resistant Staphylococcus aureus (28 Jun 2020 19:06)  Organism: Methicillin resistant Staphylococcus aureus (28 Jun 2020 19:06)    Culture - Blood (collected 26 Jun 2020 18:21)  Source: .Blood Blood-Peripheral  Preliminary Report (27 Jun 2020 19:01):    No growth to date.    Culture - Blood (collected 26 Jun 2020 18:21)  Source: .Blood Blood  Preliminary Report (27 Jun 2020 19:01):    No growth to date.        RADIOLOGY & ADDITIONAL TESTS:  Results Reviewed:   Imaging Personally Reviewed:  Electrocardiogram Personally Reviewed:    COORDINATION OF CARE:  Care Discussed with Consultants/Other Providers [Y/N]:  Prior or Outpatient Records Reviewed [Y/N]: LEONEL (obstructive sleep apnea) PROGRESS NOTE:   Authoted by Dr. Quang Ferro MD  Pager [phone] [facility], [id number] [facility]     Patient is a 54y old  Female who presents with a chief complaint of right swollen hand (29 Jun 2020 07:42)      SUBJECTIVE / OVERNIGHT EVENTS: The patient was seen and examined at bedside. No acute events overnight. THe swelling in the hand is improving. She feels like the pain medicine helps but wears off before she gets the next dose. She appears comfortable. No fevers chills, nausea, vomiting.     REVIEW OF SYSTEMS:    CONSTITUTIONAL: No weakness, fevers or chills  EYES/ENT: No visual changes;  No vertigo or throat pain   NECK: No pain or stiffness  RESPIRATORY: No cough, wheezing, hemoptysis; No shortness of breath  CARDIOVASCULAR: No chest pain or palpitations  GASTROINTESTINAL: No abdominal or epigastric pain. No nausea, vomiting, or hematemesis; No diarrhea or constipation. No melena or hematochezia.  GENITOURINARY: No dysuria, frequency or hematuria  NEUROLOGICAL: No numbness or weakness  SKIN: No itching, rashes    MEDICATIONS  (STANDING):  gabapentin 300 milliGRAM(s) Oral three times a day  lisinopril 5 milliGRAM(s) Oral daily  polyethylene glycol 3350 17 Gram(s) Oral daily  vancomycin  IVPB 1250 milliGRAM(s) IV Intermittent every 12 hours    MEDICATIONS  (PRN):  acetaminophen   Tablet .. 1000 milliGRAM(s) Oral every 6 hours PRN Moderate Pain (4 - 6)  ibuprofen  Tablet. 600 milliGRAM(s) Oral every 6 hours PRN Severe Pain (7 - 10)  traMADol 25 milliGRAM(s) Oral every 6 hours PRN Severe Pain (7 - 10)      CAPILLARY BLOOD GLUCOSE        I&O's Summary      PHYSICAL EXAM:  Vital Signs Last 24 Hrs  T(C): 36.8 (29 Jun 2020 06:38), Max: 37.1 (28 Jun 2020 19:59)  T(F): 98.3 (29 Jun 2020 06:38), Max: 98.8 (28 Jun 2020 19:59)  HR: 68 (29 Jun 2020 06:38) (68 - 76)  BP: 146/94 (29 Jun 2020 06:38) (128/82 - 146/94)  BP(mean): --  RR: 18 (29 Jun 2020 06:38) (17 - 18)  SpO2: 100% (29 Jun 2020 06:38) (99% - 100%)    CONSTITUTIONAL: NAD, well-developed  RESPIRATORY: Normal respiratory effort; lungs are clear to auscultation bilaterally  CARDIOVASCULAR: Regular rate and rhythm, normal S1 and S2, no murmur/rub/gallop; No lower extremity edema; Peripheral pulses are 2+ bilaterally  ABDOMEN: Nontender to palpation, normoactive bowel sounds, no rebound/guarding; No hepatosplenomegaly  MUSCLOSKELETAL: right thumb wound with small purulent drainage. Swelling of the thumb with erythema and warmth. no clubbing or cyanosis of digits; no joint swelling or tenderness to palpation  PSYCH: A+O to person, place, and time; affect appropriate    LABS:  Culture - Abscess with Gram Stain (collected 26 Jun 2020 20:24)  Source: .Abscess thumb - right  Preliminary Report (28 Jun 2020 19:06):    Few Methicillin resistant Staphylococcus aureus  Organism: Methicillin resistant Staphylococcus aureus (28 Jun 2020 19:06)  Organism: Methicillin resistant Staphylococcus aureus (28 Jun 2020 19:06)    Culture - Blood (collected 26 Jun 2020 18:21)  Source: .Blood Blood-Peripheral  Preliminary Report (27 Jun 2020 19:01):    No growth to date.    Culture - Blood (collected 26 Jun 2020 18:21)  Source: .Blood Blood  Preliminary Report (27 Jun 2020 19:01):    No growth to date.        RADIOLOGY & ADDITIONAL TESTS:  Results Reviewed:   Imaging Personally Reviewed:  Electrocardiogram Personally Reviewed:    COORDINATION OF CARE:  Care Discussed with Consultants/Other Providers [Y/N]: Care discussed with plastic surgeryt  Prior or Outpatient Records Reviewed [Y/N]:

## 2023-07-17 PROBLEM — Z00.00 ENCOUNTER FOR PREVENTIVE HEALTH EXAMINATION: Status: ACTIVE | Noted: 2023-07-17

## 2023-08-17 ENCOUNTER — APPOINTMENT (OUTPATIENT)
Dept: ORTHOPEDIC SURGERY | Facility: CLINIC | Age: 58
End: 2023-08-17

## 2023-10-04 NOTE — H&P ADULT - PROBLEM/PLAN-8
on the discharge service for the patient. I have reviewed and made amendments to the documentation where necessary. DISPLAY PLAN FREE TEXT

## 2024-02-08 NOTE — PROGRESS NOTE ADULT - PROBLEM SELECTOR PROBLEM 2
Hypotension, unspecified hypotension type Metabolic acidosis 1. TAKE ALL MEDICATIONS AS DIRECTED.    2. FOR PAIN OR FEVER YOU CAN TAKE IBUPROFEN (MOTRIN, ADVIL) OR ACETAMINOPHEN (TYLENOL) AS NEEDED, AS DIRECTED ON PACKAGING.  3. FOLLOW UP WITH YOUR PRIMARY DOCTOR WITHIN 5 DAYS AS DIRECTED.  4. IF YOU HAD LABS OR IMAGING DONE, YOU WERE GIVEN COPIES OF ALL LABS AND/OR IMAGING RESULTS FROM YOUR ER VISIT--PLEASE TAKE THEM WITH YOU TO YOUR FOLLOW UP APPOINTMENTS.  5. IF NEEDED, CALL PATIENT ACCESS SERVICES AT [phone] TO FIND A PRIMARY CARE PHYSICIAN.  OR CALL [phone] TO MAKE AN APPOINTMENT WITH THE CLINIC.  6. RETURN TO THE ER FOR ANY WORSENING SYMPTOMS OR CONCERNS.    Follow up with orthopedics     English    Ankle Pain  The ankle joint holds your body weight and allows you to move around. Ankle pain can occur on either side or the back of one ankle or both ankles. Ankle pain may be sharp and burning or dull and aching. There may be tenderness, stiffness, redness, or warmth around the ankle. Many things can cause ankle pain, including an injury to the area and overuse of the ankle.    Follow these instructions at home:  Activity    Rest your ankle as told by your health care provider. Avoid any activities that cause ankle pain.  Do not use the injured limb to support your body weight until your health care provider says that you can. Use crutches as told by your health care provider.  Do exercises as told by your health care provider.  Ask your health care provider when it is safe to drive if you have a brace on your ankle.  If you have a brace:    Wear the brace as told by your health care provider. Remove it only as told by your health care provider.  Loosen the brace if your toes tingle, become numb, or turn cold and blue.  Keep the brace clean.  If the brace is not waterproof:  Do not let it get wet.  Cover it with a watertight covering when you take a bath or shower.  If you were given an elastic bandage:    A person wrapping a bandage around an ankle.  Remove it when you take a bath or a shower.  Try not to move your ankle very much, but wiggle your toes from time to time. This helps to prevent swelling.  Adjust the bandage to make it more comfortable if it feels too tight.  Loosen the bandage if you have numbness or tingling in your foot or if your foot turns cold and blue.  Managing pain, stiffness, and swelling    Bag of ice on a towel on the skin.  If directed, put ice on the painful area.  If you have a removable brace or elastic bandage, remove it as told by your health care provider.  Put ice in a plastic bag.  Place a towel between your skin and the bag.  Leave the ice on for 20 minutes, 2–3 times a day.  Move your toes often to avoid stiffness and to lessen swelling.  Raise (elevate) your ankle above the level of your heart while you are sitting or lying down.  General instructions    Record information about your pain. Writing down the following may be helpful for you and your health care provider:  How often you have ankle pain.  Where the pain is located.  What the pain feels like.  If treatment involves wearing a prescribed shoe or insole, make sure you wear it correctly and for as long as told by your health care provider.  Take over-the-counter and prescription medicines only as told by your health care provider.  Keep all follow-up visits as told by your health care provider. This is important.  Contact a health care provider if:  Your pain gets worse.  Your pain is not relieved with medicines.  You have a fever or chills.  You are having more trouble with walking.  You have new symptoms.  Get help right away if:  Your foot, leg, toes, or ankle:  Tingles or becomes numb.  Becomes swollen.  Turns pale or blue.  Summary  Ankle pain can occur on either side or the back of one ankle or both ankles.  Ankle pain may be sharp and burning or dull and aching.  Rest your ankle as told by your health care provider. If told, apply ice to the area.  Take over-the-counter and prescription medicines only as told by your health care provider.  This information is not intended to replace advice given to you by your health care provider. Make sure you discuss any questions you have with your health care provider.    Document Revised: 02/08/2022 Document Reviewed: 02/10/2022  [employer] Patient Education © 2023 [employer]  [employer] logo  Terms and Conditions  Privacy Policy  Editorial Policy  All content on this site: Copyright © 2024 [employer], its licensors, and contributors. All rights are reserved, including those for text and data mining, AI training, and similar technologies. For all open access content, the Creative Commons licensing terms apply.  Cookies are used by this site. To decline or learn more, visit our Cookies page.  [employer]

## 2024-05-06 NOTE — ED PROVIDER NOTE - NSCAREINITIATED _GEN_ER
Hans De Anda)
impaired balance/cognition/pain/impaired postural control/decreased ROM/decreased strength

## 2024-05-09 NOTE — ED PROVIDER NOTE - CONSTITUTIONAL, MLM
unable to care for self normal... Well appearing, awake, alert, oriented to person, place, time/situation and in no apparent distress.